# Patient Record
Sex: FEMALE | Race: OTHER | HISPANIC OR LATINO
[De-identification: names, ages, dates, MRNs, and addresses within clinical notes are randomized per-mention and may not be internally consistent; named-entity substitution may affect disease eponyms.]

---

## 2017-01-18 ENCOUNTER — RESULT REVIEW (OUTPATIENT)
Age: 37
End: 2017-01-18

## 2017-02-01 ENCOUNTER — APPOINTMENT (OUTPATIENT)
Dept: MAMMOGRAPHY | Facility: CLINIC | Age: 37
End: 2017-02-01

## 2017-02-01 ENCOUNTER — APPOINTMENT (OUTPATIENT)
Dept: ULTRASOUND IMAGING | Facility: CLINIC | Age: 37
End: 2017-02-01

## 2017-02-01 ENCOUNTER — OUTPATIENT (OUTPATIENT)
Dept: OUTPATIENT SERVICES | Facility: HOSPITAL | Age: 37
LOS: 1 days | End: 2017-02-01
Payer: COMMERCIAL

## 2017-02-01 DIAGNOSIS — Z00.8 ENCOUNTER FOR OTHER GENERAL EXAMINATION: ICD-10-CM

## 2017-02-01 PROCEDURE — 76641 ULTRASOUND BREAST COMPLETE: CPT

## 2017-02-01 PROCEDURE — 77066 DX MAMMO INCL CAD BI: CPT

## 2017-02-01 PROCEDURE — G0279: CPT

## 2018-08-09 ENCOUNTER — RESULT REVIEW (OUTPATIENT)
Age: 38
End: 2018-08-09

## 2018-08-13 ENCOUNTER — APPOINTMENT (OUTPATIENT)
Dept: ULTRASOUND IMAGING | Facility: CLINIC | Age: 38
End: 2018-08-13
Payer: SELF-PAY

## 2018-08-13 ENCOUNTER — OUTPATIENT (OUTPATIENT)
Dept: OUTPATIENT SERVICES | Facility: HOSPITAL | Age: 38
LOS: 1 days | End: 2018-08-13
Payer: MEDICAID

## 2018-08-13 DIAGNOSIS — Z00.8 ENCOUNTER FOR OTHER GENERAL EXAMINATION: ICD-10-CM

## 2018-08-13 PROCEDURE — 76856 US EXAM PELVIC COMPLETE: CPT

## 2018-08-13 PROCEDURE — 76830 TRANSVAGINAL US NON-OB: CPT | Mod: 26

## 2018-08-13 PROCEDURE — 76830 TRANSVAGINAL US NON-OB: CPT

## 2018-08-13 PROCEDURE — 76856 US EXAM PELVIC COMPLETE: CPT | Mod: 26

## 2018-09-10 ENCOUNTER — RESULT REVIEW (OUTPATIENT)
Age: 38
End: 2018-09-10

## 2019-10-02 ENCOUNTER — RESULT REVIEW (OUTPATIENT)
Age: 39
End: 2019-10-02

## 2019-11-10 ENCOUNTER — RESULT REVIEW (OUTPATIENT)
Age: 39
End: 2019-11-10

## 2020-03-23 ENCOUNTER — EMERGENCY (EMERGENCY)
Facility: HOSPITAL | Age: 40
LOS: 0 days | Discharge: HOME | End: 2020-03-23
Payer: MEDICAID

## 2020-03-23 VITALS
TEMPERATURE: 100 F | OXYGEN SATURATION: 100 % | DIASTOLIC BLOOD PRESSURE: 91 MMHG | RESPIRATION RATE: 16 BRPM | SYSTOLIC BLOOD PRESSURE: 144 MMHG | HEART RATE: 100 BPM

## 2020-03-23 DIAGNOSIS — Z20.828 CONTACT WITH AND (SUSPECTED) EXPOSURE TO OTHER VIRAL COMMUNICABLE DISEASES: ICD-10-CM

## 2020-03-23 DIAGNOSIS — R05 COUGH: ICD-10-CM

## 2020-03-23 DIAGNOSIS — R50.9 FEVER, UNSPECIFIED: ICD-10-CM

## 2020-03-23 DIAGNOSIS — R07.0 PAIN IN THROAT: ICD-10-CM

## 2020-03-23 DIAGNOSIS — B97.29 OTHER CORONAVIRUS AS THE CAUSE OF DISEASES CLASSIFIED ELSEWHERE: ICD-10-CM

## 2020-03-23 PROCEDURE — 99283 EMERGENCY DEPT VISIT LOW MDM: CPT

## 2020-03-23 PROCEDURE — U0001: CPT

## 2020-03-23 NOTE — ED STATDOCS - PATIENT PORTAL LINK FT
You can access the FollowMyHealth Patient Portal offered by [facility] by registering at the following website: [email or web address]. By joining [employer]’s FollowMyHealth portal, you will also be able to view your health information using other applications (apps) compatible with our system.

## 2020-03-23 NOTE — ED STATDOCS - CARE PLAN
Principal Discharge DX:	Fever, unspecified fever cause  Secondary Diagnosis:	Body aches  Secondary Diagnosis:	Sore throat

## 2020-03-23 NOTE — ED STATDOCS - NSFOLLOWUPINSTRUCTIONS_ED_ALL_ED_FT
How to get your Coronavirus (COVID-19) Testing Results:   Please be advised that you were tested for the coronavirus (COVID-19) in the Emergency Department at [facility].  You are to maintain self-quarantine procedures for 14 days until instructed otherwise by one of our healthcare agents. Please note that the test may take up to 2-4 days to result.  If you do not hear from us within 72 hours and you'd like to check on your results, you can call on of our coronavirus specialists at [address] [place] [address] (available 24/7).  Please DO NOT call the site where you received the test to obtain your results.     Return to the ED for worsening sx including SOB

## 2020-03-23 NOTE — ED STATDOCS - OBJECTIVE STATEMENT
Pt presents to ED with fever, body aches, sore throat x3  days. Pt recently exposed to COVID-19. Pt here for testing.

## 2020-10-05 ENCOUNTER — RESULT REVIEW (OUTPATIENT)
Age: 40
End: 2020-10-05

## 2021-03-05 ENCOUNTER — EMERGENCY (EMERGENCY)
Facility: HOSPITAL | Age: 41
LOS: 0 days | Discharge: HOME | End: 2021-03-05
Attending: EMERGENCY MEDICINE
Payer: MEDICAID

## 2021-03-05 VITALS
TEMPERATURE: 98 F | DIASTOLIC BLOOD PRESSURE: 71 MMHG | SYSTOLIC BLOOD PRESSURE: 119 MMHG | HEART RATE: 74 BPM | RESPIRATION RATE: 16 BRPM | OXYGEN SATURATION: 100 %

## 2021-03-05 VITALS — WEIGHT: 166.89 LBS | HEIGHT: 62 IN

## 2021-03-05 DIAGNOSIS — R10.11 RIGHT UPPER QUADRANT PAIN: ICD-10-CM

## 2021-03-05 DIAGNOSIS — Z20.822 CONTACT WITH AND (SUSPECTED) EXPOSURE TO COVID-19: ICD-10-CM

## 2021-03-05 PROBLEM — Z78.9 OTHER SPECIFIED HEALTH STATUS: Chronic | Status: ACTIVE | Noted: 2020-03-23

## 2021-03-05 LAB
ALBUMIN SERPL ELPH-MCNC: 3.8 G/DL — SIGNIFICANT CHANGE UP (ref 3.3–5)
ALP SERPL-CCNC: 95 U/L — SIGNIFICANT CHANGE UP (ref 40–120)
ALT FLD-CCNC: 49 U/L — SIGNIFICANT CHANGE UP (ref 12–78)
ANION GAP SERPL CALC-SCNC: 4 MMOL/L — LOW (ref 5–17)
APPEARANCE UR: CLEAR — SIGNIFICANT CHANGE UP
APTT BLD: 32.2 SEC — SIGNIFICANT CHANGE UP (ref 27.5–35.5)
AST SERPL-CCNC: 17 U/L — SIGNIFICANT CHANGE UP (ref 15–37)
BASOPHILS # BLD AUTO: 0.08 K/UL — SIGNIFICANT CHANGE UP (ref 0–0.2)
BASOPHILS NFR BLD AUTO: 0.7 % — SIGNIFICANT CHANGE UP (ref 0–2)
BILIRUB SERPL-MCNC: 0.1 MG/DL — LOW (ref 0.2–1.2)
BILIRUB UR-MCNC: NEGATIVE — SIGNIFICANT CHANGE UP
BUN SERPL-MCNC: 11 MG/DL — SIGNIFICANT CHANGE UP (ref 7–23)
CALCIUM SERPL-MCNC: 8.6 MG/DL — SIGNIFICANT CHANGE UP (ref 8.5–10.1)
CHLORIDE SERPL-SCNC: 107 MMOL/L — SIGNIFICANT CHANGE UP (ref 96–108)
CO2 SERPL-SCNC: 28 MMOL/L — SIGNIFICANT CHANGE UP (ref 22–31)
COLOR SPEC: YELLOW — SIGNIFICANT CHANGE UP
CREAT SERPL-MCNC: 0.72 MG/DL — SIGNIFICANT CHANGE UP (ref 0.5–1.3)
DIFF PNL FLD: ABNORMAL
EOSINOPHIL # BLD AUTO: 0.39 K/UL — SIGNIFICANT CHANGE UP (ref 0–0.5)
EOSINOPHIL NFR BLD AUTO: 3.4 % — SIGNIFICANT CHANGE UP (ref 0–6)
GLUCOSE SERPL-MCNC: 92 MG/DL — SIGNIFICANT CHANGE UP (ref 70–99)
GLUCOSE UR QL: NEGATIVE MG/DL — SIGNIFICANT CHANGE UP
HCT VFR BLD CALC: 36.4 % — SIGNIFICANT CHANGE UP (ref 34.5–45)
HGB BLD-MCNC: 11.7 G/DL — SIGNIFICANT CHANGE UP (ref 11.5–15.5)
IMM GRANULOCYTES NFR BLD AUTO: 0.5 % — SIGNIFICANT CHANGE UP (ref 0–1.5)
INR BLD: 0.99 RATIO — SIGNIFICANT CHANGE UP (ref 0.88–1.16)
KETONES UR-MCNC: NEGATIVE — SIGNIFICANT CHANGE UP
LEUKOCYTE ESTERASE UR-ACNC: ABNORMAL
LIDOCAIN IGE QN: 107 U/L — SIGNIFICANT CHANGE UP (ref 73–393)
LYMPHOCYTES # BLD AUTO: 2.84 K/UL — SIGNIFICANT CHANGE UP (ref 1–3.3)
LYMPHOCYTES # BLD AUTO: 25 % — SIGNIFICANT CHANGE UP (ref 13–44)
MCHC RBC-ENTMCNC: 26.4 PG — LOW (ref 27–34)
MCHC RBC-ENTMCNC: 32.1 GM/DL — SIGNIFICANT CHANGE UP (ref 32–36)
MCV RBC AUTO: 82.2 FL — SIGNIFICANT CHANGE UP (ref 80–100)
MONOCYTES # BLD AUTO: 0.99 K/UL — HIGH (ref 0–0.9)
MONOCYTES NFR BLD AUTO: 8.7 % — SIGNIFICANT CHANGE UP (ref 2–14)
NEUTROPHILS # BLD AUTO: 6.98 K/UL — SIGNIFICANT CHANGE UP (ref 1.8–7.4)
NEUTROPHILS NFR BLD AUTO: 61.7 % — SIGNIFICANT CHANGE UP (ref 43–77)
NITRITE UR-MCNC: NEGATIVE — SIGNIFICANT CHANGE UP
PH UR: 6 — SIGNIFICANT CHANGE UP (ref 5–8)
PLATELET # BLD AUTO: 259 K/UL — SIGNIFICANT CHANGE UP (ref 150–400)
POTASSIUM SERPL-MCNC: 3.5 MMOL/L — SIGNIFICANT CHANGE UP (ref 3.5–5.3)
POTASSIUM SERPL-SCNC: 3.5 MMOL/L — SIGNIFICANT CHANGE UP (ref 3.5–5.3)
PROT SERPL-MCNC: 7.6 GM/DL — SIGNIFICANT CHANGE UP (ref 6–8.3)
PROT UR-MCNC: NEGATIVE MG/DL — SIGNIFICANT CHANGE UP
PROTHROM AB SERPL-ACNC: 11.6 SEC — SIGNIFICANT CHANGE UP (ref 10.6–13.6)
RBC # BLD: 4.43 M/UL — SIGNIFICANT CHANGE UP (ref 3.8–5.2)
RBC # FLD: 14.9 % — HIGH (ref 10.3–14.5)
SARS-COV-2 RNA SPEC QL NAA+PROBE: SIGNIFICANT CHANGE UP
SODIUM SERPL-SCNC: 139 MMOL/L — SIGNIFICANT CHANGE UP (ref 135–145)
SP GR SPEC: 1.01 — SIGNIFICANT CHANGE UP (ref 1.01–1.02)
UROBILINOGEN FLD QL: NEGATIVE MG/DL — SIGNIFICANT CHANGE UP
WBC # BLD: 11.34 K/UL — HIGH (ref 3.8–10.5)
WBC # FLD AUTO: 11.34 K/UL — HIGH (ref 3.8–10.5)

## 2021-03-05 PROCEDURE — 80053 COMPREHEN METABOLIC PANEL: CPT

## 2021-03-05 PROCEDURE — 87040 BLOOD CULTURE FOR BACTERIA: CPT

## 2021-03-05 PROCEDURE — 96375 TX/PRO/DX INJ NEW DRUG ADDON: CPT | Mod: XU

## 2021-03-05 PROCEDURE — 86901 BLOOD TYPING SEROLOGIC RH(D): CPT

## 2021-03-05 PROCEDURE — 83690 ASSAY OF LIPASE: CPT

## 2021-03-05 PROCEDURE — 81025 URINE PREGNANCY TEST: CPT | Mod: XU

## 2021-03-05 PROCEDURE — 87086 URINE CULTURE/COLONY COUNT: CPT

## 2021-03-05 PROCEDURE — 78226 HEPATOBILIARY SYSTEM IMAGING: CPT | Mod: 26

## 2021-03-05 PROCEDURE — 36415 COLL VENOUS BLD VENIPUNCTURE: CPT

## 2021-03-05 PROCEDURE — A9537: CPT

## 2021-03-05 PROCEDURE — 85610 PROTHROMBIN TIME: CPT

## 2021-03-05 PROCEDURE — 76705 ECHO EXAM OF ABDOMEN: CPT | Mod: 26

## 2021-03-05 PROCEDURE — 99284 EMERGENCY DEPT VISIT MOD MDM: CPT | Mod: 25

## 2021-03-05 PROCEDURE — 87635 SARS-COV-2 COVID-19 AMP PRB: CPT

## 2021-03-05 PROCEDURE — 85025 COMPLETE CBC W/AUTO DIFF WBC: CPT

## 2021-03-05 PROCEDURE — 81001 URINALYSIS AUTO W/SCOPE: CPT

## 2021-03-05 PROCEDURE — 96374 THER/PROPH/DIAG INJ IV PUSH: CPT | Mod: XU

## 2021-03-05 PROCEDURE — 78226 HEPATOBILIARY SYSTEM IMAGING: CPT

## 2021-03-05 PROCEDURE — U0005: CPT

## 2021-03-05 PROCEDURE — 99284 EMERGENCY DEPT VISIT MOD MDM: CPT

## 2021-03-05 PROCEDURE — 76705 ECHO EXAM OF ABDOMEN: CPT

## 2021-03-05 PROCEDURE — 96376 TX/PRO/DX INJ SAME DRUG ADON: CPT | Mod: XU

## 2021-03-05 PROCEDURE — 85730 THROMBOPLASTIN TIME PARTIAL: CPT

## 2021-03-05 PROCEDURE — 86850 RBC ANTIBODY SCREEN: CPT

## 2021-03-05 PROCEDURE — 86900 BLOOD TYPING SEROLOGIC ABO: CPT

## 2021-03-05 RX ORDER — FAMOTIDINE 10 MG/ML
20 INJECTION INTRAVENOUS ONCE
Refills: 0 | Status: COMPLETED | OUTPATIENT
Start: 2021-03-05 | End: 2021-03-05

## 2021-03-05 RX ORDER — PIPERACILLIN AND TAZOBACTAM 4; .5 G/20ML; G/20ML
3.38 INJECTION, POWDER, LYOPHILIZED, FOR SOLUTION INTRAVENOUS ONCE
Refills: 0 | Status: COMPLETED | OUTPATIENT
Start: 2021-03-05 | End: 2021-03-05

## 2021-03-05 RX ORDER — ACETAMINOPHEN 500 MG
650 TABLET ORAL ONCE
Refills: 0 | Status: COMPLETED | OUTPATIENT
Start: 2021-03-05 | End: 2021-03-05

## 2021-03-05 RX ORDER — ONDANSETRON 8 MG/1
4 TABLET, FILM COATED ORAL ONCE
Refills: 0 | Status: COMPLETED | OUTPATIENT
Start: 2021-03-05 | End: 2021-03-05

## 2021-03-05 RX ORDER — MORPHINE SULFATE 50 MG/1
4 CAPSULE, EXTENDED RELEASE ORAL ONCE
Refills: 0 | Status: DISCONTINUED | OUTPATIENT
Start: 2021-03-05 | End: 2021-03-05

## 2021-03-05 RX ORDER — SODIUM CHLORIDE 9 MG/ML
1000 INJECTION INTRAMUSCULAR; INTRAVENOUS; SUBCUTANEOUS ONCE
Refills: 0 | Status: COMPLETED | OUTPATIENT
Start: 2021-03-05 | End: 2021-03-05

## 2021-03-05 RX ADMIN — MORPHINE SULFATE 4 MILLIGRAM(S): 50 CAPSULE, EXTENDED RELEASE ORAL at 03:03

## 2021-03-05 RX ADMIN — MORPHINE SULFATE 4 MILLIGRAM(S): 50 CAPSULE, EXTENDED RELEASE ORAL at 05:23

## 2021-03-05 RX ADMIN — ONDANSETRON 4 MILLIGRAM(S): 8 TABLET, FILM COATED ORAL at 03:03

## 2021-03-05 RX ADMIN — Medication 650 MILLIGRAM(S): at 10:32

## 2021-03-05 RX ADMIN — FAMOTIDINE 20 MILLIGRAM(S): 10 INJECTION INTRAVENOUS at 10:56

## 2021-03-05 RX ADMIN — SODIUM CHLORIDE 1000 MILLILITER(S): 9 INJECTION INTRAMUSCULAR; INTRAVENOUS; SUBCUTANEOUS at 04:52

## 2021-03-05 RX ADMIN — MORPHINE SULFATE 4 MILLIGRAM(S): 50 CAPSULE, EXTENDED RELEASE ORAL at 04:52

## 2021-03-05 RX ADMIN — SODIUM CHLORIDE 2000 MILLILITER(S): 9 INJECTION INTRAMUSCULAR; INTRAVENOUS; SUBCUTANEOUS at 03:03

## 2021-03-05 RX ADMIN — PIPERACILLIN AND TAZOBACTAM 200 GRAM(S): 4; .5 INJECTION, POWDER, LYOPHILIZED, FOR SOLUTION INTRAVENOUS at 05:22

## 2021-03-05 NOTE — ED PROVIDER NOTE - PATIENT PORTAL LINK FT
You can access the FollowMyHealth Patient Portal offered by [facility] by registering at the following website: [email or web address]. By joining [employer]’s FollowMyHealth portal, you will also be able to view your health information using other applications (apps) compatible with our system.

## 2021-03-05 NOTE — ED PROVIDER NOTE - PROGRESS NOTE DETAILS
Attending sage Cortez/ajit Bradshaw for surgery consult [phone] - , pt updated on results of tests and plan for surg consult and to start abx. pt feeling better, hida negative asking for tylenol for headache, will dc home to f/u with gastroenterology. abdomen reevaluated soft nontender no re bound or guarding

## 2021-03-05 NOTE — CONSULT NOTE ADULT - ASSESSMENT
40F with RUQ pain and findings suspicious for acute cholecystitis    Plan:  Pain control  Nausea control PRN  Antibiotics  NPO  IV Hydration  recc HIDA scan  Incentive Spirometry  COVID Swab  Encourage OOB to ambulation  GI and DVT ppx  Venodynes  Plan for possible Laparoscopic cholecystectomy if HIDA is positive    Case discussed with Dr. Bradshaw

## 2021-03-05 NOTE — ED PROVIDER NOTE - CONSTITUTIONAL, MLM
normal... Well appearing, awake, alert, oriented to person, place, time/situation and in moderate distress due to pain.

## 2021-03-05 NOTE — ED ADULT TRIAGE NOTE - CHIEF COMPLAINT QUOTE
patient c/o RUQ abdominal pain.  patient reports onset of pain around 3 pm and reports pain radiates to back.  denies N/V/D, chest pain, SOB.  no meds taken PTA.

## 2021-03-05 NOTE — CONSULT NOTE ADULT - SUBJECTIVE AND OBJECTIVE BOX
40F presents with one day of RUQ abdominal pain. Never had pain like this before. Not associated with nausea/vomiting. Last BM yesterday, normal as per patient. Denies fevers, chills, diarrhea, constipation, shortness of breath.    PMH: denies  PSH: denies  NKDA              Vital Signs Last 24 Hrs  T(C): 36.7 (05 Mar 2021 01:34), Max: 36.7 (05 Mar 2021 01:34)  T(F): 98.1 (05 Mar 2021 01:34), Max: 98.1 (05 Mar 2021 01:34)  HR: 77 (05 Mar 2021 03:01) (77 - 82)  BP: 137/82 (05 Mar 2021 03:01) (137/82 - 147/96)  BP(mean): 96 (05 Mar 2021 03:01) (96 - 108)  RR: 17 (05 Mar 2021 03:01) (15 - 17)  SpO2: 100% (05 Mar 2021 03:01) (100% - 100%)    Labs:                                11.7   11.34 )-----------( 259      ( 05 Mar 2021 02:21 )             36.4     CBC Full  -  ( 05 Mar 2021 02:21 )  WBC Count : 11.34 K/uL  RBC Count : 4.43 M/uL  Hemoglobin : 11.7 g/dL  Hematocrit : 36.4 %  Platelet Count - Automated : 259 K/uL  Mean Cell Volume : 82.2 fl  Mean Cell Hemoglobin : 26.4 pg  Mean Cell Hemoglobin Concentration : 32.1 gm/dL  Auto Neutrophil # : 6.98 K/uL  Auto Lymphocyte # : 2.84 K/uL  Auto Monocyte # : 0.99 K/uL  Auto Eosinophil # : 0.39 K/uL  Auto Basophil # : 0.08 K/uL  Auto Neutrophil % : 61.7 %  Auto Lymphocyte % : 25.0 %  Auto Monocyte % : 8.7 %  Auto Eosinophil % : 3.4 %  Auto Basophil % : 0.7 %    03-05    139  |  107  |  11  ----------------------------<  92  3.5   |  28  |  0.72    Ca    8.6      05 Mar 2021 02:21    TPro  7.6  /  Alb  3.8  /  TBili  0.1<L>  /  DBili  x   /  AST  17  /  ALT  49  /  AlkPhos  95  03-05    LIVER FUNCTIONS - ( 05 Mar 2021 02:21 )  Alb: 3.8 g/dL / Pro: 7.6 gm/dL / ALK PHOS: 95 U/L / ALT: 49 U/L / AST: 17 U/L / GGT: x           PT/INR - ( 05 Mar 2021 02:21 )   PT: 11.6 sec;   INR: 0.99 ratio         PTT - ( 05 Mar 2021 02:21 )  PTT:32.2 sec      Meds:  morphine  - Injectable 4 milliGRAM(s) IV Push once  piperacillin/tazobactam IVPB. 3.375 Gram(s) IV Intermittent Once      Radiology:    EXAM: US ABDOMEN RT UPR QUADRANT      PROCEDURE DATE: 03/05/2021        INTERPRETATION: CLINICAL INDICATION: Right upper quadrant pain, assess gallbladder disease.    TECHNIQUE: Targeted right upper quadrant ultrasound of the abdomen was performed.    COMPARISON: None.    FINDINGS: This study was technically difficult due to bowel gas.    LIVER: 18.2 cm in length. Increased echogenicity, suggestive of fatty infiltration.  BILIARY: No intrahepatic or extrahepatic biliary dilatation. Visualized common bile duct measuring up to 0.4 cm.  GALLBLADDER: Distended. No obvious gallstone or sludge. No significant gallbladder wall thickening or pericholecystic fluid. Positive sonographic Escobar sign. Patient was not premedicated.  PANCREAS: Poorly visualized due to bowel gas.  RIGHT KIDNEY: 12.9 cm in length. No hydronephrosis or obvious renal stone.  ADDITIONAL: No ascites.    IMPRESSION:    Distended gallbladder without obvious gallstone, sludge, gallbladder wall thickening or pedicle cystic fluid. Positive sonographic Escobar sign. If there is clinical suspicion for acute cholecystitis, a hepatobiliary scan may be obtained for further evaluation.    Physical Exam:  Pt is AAOx3  General: Well developed, in no acute distress.   Abdomen: Soft, RUQ tenderness to deep palpation, no masses, nondistended.   Skin: Normal, no rashes, no lesions noted.   Extremities: Warm, well perfused, no edema, Pulses intact

## 2021-03-05 NOTE — ED PROVIDER NOTE - OBJECTIVE STATEMENT
- [id number] used.  Pt with no sig PMH and past surgical hx "to not have babies" presents to ED for abd pain. Started ~12 hours ago.  pain right abd. No n/v/d, pain worse with movement.  denies trauma, no travel.  pain sever 8/10.  pt brought to ED by friend.

## 2021-03-05 NOTE — ED PROVIDER NOTE - NSFOLLOWUPCLINICS_GEN_ALL_ED_FT
[facility]  Family Medicine  [address] [place] [address]  Phone: [phone]  Fax:   Follow Up Time:

## 2021-03-05 NOTE — ED ADULT NURSE NOTE - OBJECTIVE STATEMENT
Pt ambulatory to Ed for c/o right upper abd pain for the last three days. Describes ans flank pain into back. Denies fever nausea or vomiting. No acute distress at this time.

## 2021-03-05 NOTE — ED PROVIDER NOTE - CARE PROVIDERS DIRECT ADDRESSES
[email or web address]

## 2021-03-05 NOTE — ED ADULT NURSE NOTE - NSIMPLEMENTINTERV_GEN_ALL_ED
Implemented All Fall Risk Interventions:  [place] to call system. Call bell, personal items and telephone within reach. Instruct patient to call for assistance. Room bathroom lighting operational. Non-slip footwear when patient is off stretcher. Physically safe environment: no spills, clutter or unnecessary equipment. Stretcher in lowest position, wheels locked, appropriate side rails in place. Provide visual cue, wrist band, yellow gown, etc. Monitor gait and stability. Monitor for mental status changes and reorient to person, place, and time. Review medications for side effects contributing to fall risk. Reinforce activity limits and safety measures with patient and family.

## 2021-03-06 LAB
CULTURE RESULTS: SIGNIFICANT CHANGE UP
SPECIMEN SOURCE: SIGNIFICANT CHANGE UP

## 2021-03-10 LAB
CULTURE RESULTS: SIGNIFICANT CHANGE UP
SPECIMEN SOURCE: SIGNIFICANT CHANGE UP

## 2021-06-11 ENCOUNTER — EMERGENCY (EMERGENCY)
Facility: HOSPITAL | Age: 41
LOS: 1 days | Discharge: HOME | End: 2021-06-11
Attending: EMERGENCY MEDICINE | Admitting: EMERGENCY MEDICINE
Payer: MEDICAID

## 2021-06-11 VITALS
TEMPERATURE: 98 F | RESPIRATION RATE: 16 BRPM | DIASTOLIC BLOOD PRESSURE: 76 MMHG | SYSTOLIC BLOOD PRESSURE: 132 MMHG | HEART RATE: 72 BPM | OXYGEN SATURATION: 100 %

## 2021-06-11 VITALS
DIASTOLIC BLOOD PRESSURE: 96 MMHG | HEIGHT: 62 IN | TEMPERATURE: 98 F | HEART RATE: 83 BPM | OXYGEN SATURATION: 100 % | RESPIRATION RATE: 18 BRPM | WEIGHT: 160.06 LBS | SYSTOLIC BLOOD PRESSURE: 158 MMHG

## 2021-06-11 LAB
ALBUMIN SERPL ELPH-MCNC: 3.8 G/DL — SIGNIFICANT CHANGE UP (ref 3.3–5)
ALP SERPL-CCNC: 78 U/L — SIGNIFICANT CHANGE UP (ref 30–120)
ALT FLD-CCNC: 30 U/L DA — SIGNIFICANT CHANGE UP (ref 10–60)
ANION GAP SERPL CALC-SCNC: 7 MMOL/L — SIGNIFICANT CHANGE UP (ref 5–17)
APPEARANCE UR: CLEAR — SIGNIFICANT CHANGE UP
AST SERPL-CCNC: 12 U/L — SIGNIFICANT CHANGE UP (ref 10–40)
BACTERIA # UR AUTO: ABNORMAL
BASOPHILS # BLD AUTO: 0.07 K/UL — SIGNIFICANT CHANGE UP (ref 0–0.2)
BASOPHILS NFR BLD AUTO: 0.5 % — SIGNIFICANT CHANGE UP (ref 0–2)
BILIRUB SERPL-MCNC: 0.2 MG/DL — SIGNIFICANT CHANGE UP (ref 0.2–1.2)
BILIRUB UR-MCNC: NEGATIVE — SIGNIFICANT CHANGE UP
BUN SERPL-MCNC: 12 MG/DL — SIGNIFICANT CHANGE UP (ref 7–23)
CALCIUM SERPL-MCNC: 8.6 MG/DL — SIGNIFICANT CHANGE UP (ref 8.4–10.5)
CHLORIDE SERPL-SCNC: 100 MMOL/L — SIGNIFICANT CHANGE UP (ref 96–108)
CO2 SERPL-SCNC: 27 MMOL/L — SIGNIFICANT CHANGE UP (ref 22–31)
COLOR SPEC: YELLOW — SIGNIFICANT CHANGE UP
COMMENT - URINE: SIGNIFICANT CHANGE UP
CREAT SERPL-MCNC: 0.86 MG/DL — SIGNIFICANT CHANGE UP (ref 0.5–1.3)
DIFF PNL FLD: ABNORMAL
EOSINOPHIL # BLD AUTO: 0.41 K/UL — SIGNIFICANT CHANGE UP (ref 0–0.5)
EOSINOPHIL NFR BLD AUTO: 3.1 % — SIGNIFICANT CHANGE UP (ref 0–6)
EPI CELLS # UR: SIGNIFICANT CHANGE UP
GLUCOSE SERPL-MCNC: 102 MG/DL — HIGH (ref 70–99)
GLUCOSE UR QL: NEGATIVE MG/DL — SIGNIFICANT CHANGE UP
HCG UR QL: NEGATIVE — SIGNIFICANT CHANGE UP
HCT VFR BLD CALC: 36.2 % — SIGNIFICANT CHANGE UP (ref 34.5–45)
HGB BLD-MCNC: 11.7 G/DL — SIGNIFICANT CHANGE UP (ref 11.5–15.5)
IMM GRANULOCYTES NFR BLD AUTO: 0.5 % — SIGNIFICANT CHANGE UP (ref 0–1.5)
KETONES UR-MCNC: NEGATIVE — SIGNIFICANT CHANGE UP
LEUKOCYTE ESTERASE UR-ACNC: ABNORMAL
LIDOCAIN IGE QN: 106 U/L — SIGNIFICANT CHANGE UP (ref 73–393)
LYMPHOCYTES # BLD AUTO: 34.6 % — SIGNIFICANT CHANGE UP (ref 13–44)
LYMPHOCYTES # BLD AUTO: 4.55 K/UL — HIGH (ref 1–3.3)
MCHC RBC-ENTMCNC: 26.7 PG — LOW (ref 27–34)
MCHC RBC-ENTMCNC: 32.3 GM/DL — SIGNIFICANT CHANGE UP (ref 32–36)
MCV RBC AUTO: 82.6 FL — SIGNIFICANT CHANGE UP (ref 80–100)
MONOCYTES # BLD AUTO: 1 K/UL — HIGH (ref 0–0.9)
MONOCYTES NFR BLD AUTO: 7.6 % — SIGNIFICANT CHANGE UP (ref 2–14)
NEUTROPHILS # BLD AUTO: 7.05 K/UL — SIGNIFICANT CHANGE UP (ref 1.8–7.4)
NEUTROPHILS NFR BLD AUTO: 53.7 % — SIGNIFICANT CHANGE UP (ref 43–77)
NITRITE UR-MCNC: NEGATIVE — SIGNIFICANT CHANGE UP
NRBC # BLD: 0 /100 WBCS — SIGNIFICANT CHANGE UP (ref 0–0)
PH UR: 7 — SIGNIFICANT CHANGE UP (ref 5–8)
PLATELET # BLD AUTO: 241 K/UL — SIGNIFICANT CHANGE UP (ref 150–400)
POTASSIUM SERPL-MCNC: 3 MMOL/L — LOW (ref 3.5–5.3)
POTASSIUM SERPL-SCNC: 3 MMOL/L — LOW (ref 3.5–5.3)
PROT SERPL-MCNC: 7.2 G/DL — SIGNIFICANT CHANGE UP (ref 6–8.3)
PROT UR-MCNC: NEGATIVE MG/DL — SIGNIFICANT CHANGE UP
RBC # BLD: 4.38 M/UL — SIGNIFICANT CHANGE UP (ref 3.8–5.2)
RBC # FLD: 15.4 % — HIGH (ref 10.3–14.5)
RBC CASTS # UR COMP ASSIST: SIGNIFICANT CHANGE UP /HPF (ref 0–4)
SODIUM SERPL-SCNC: 134 MMOL/L — LOW (ref 135–145)
SP GR SPEC: 1.01 — SIGNIFICANT CHANGE UP (ref 1.01–1.02)
UROBILINOGEN FLD QL: NEGATIVE MG/DL — SIGNIFICANT CHANGE UP
WBC # BLD: 13.15 K/UL — HIGH (ref 3.8–10.5)
WBC # FLD AUTO: 13.15 K/UL — HIGH (ref 3.8–10.5)
WBC UR QL: SIGNIFICANT CHANGE UP

## 2021-06-11 PROCEDURE — 85025 COMPLETE CBC W/AUTO DIFF WBC: CPT

## 2021-06-11 PROCEDURE — 76705 ECHO EXAM OF ABDOMEN: CPT | Mod: 26

## 2021-06-11 PROCEDURE — 80053 COMPREHEN METABOLIC PANEL: CPT

## 2021-06-11 PROCEDURE — 81025 URINE PREGNANCY TEST: CPT

## 2021-06-11 PROCEDURE — 99284 EMERGENCY DEPT VISIT MOD MDM: CPT | Mod: 25

## 2021-06-11 PROCEDURE — A9537: CPT

## 2021-06-11 PROCEDURE — 76705 ECHO EXAM OF ABDOMEN: CPT

## 2021-06-11 PROCEDURE — 96361 HYDRATE IV INFUSION ADD-ON: CPT

## 2021-06-11 PROCEDURE — 78226 HEPATOBILIARY SYSTEM IMAGING: CPT | Mod: 26,MA

## 2021-06-11 PROCEDURE — 78226 HEPATOBILIARY SYSTEM IMAGING: CPT

## 2021-06-11 PROCEDURE — 36415 COLL VENOUS BLD VENIPUNCTURE: CPT

## 2021-06-11 PROCEDURE — 83690 ASSAY OF LIPASE: CPT

## 2021-06-11 PROCEDURE — 96375 TX/PRO/DX INJ NEW DRUG ADDON: CPT

## 2021-06-11 PROCEDURE — 81001 URINALYSIS AUTO W/SCOPE: CPT

## 2021-06-11 PROCEDURE — 99284 EMERGENCY DEPT VISIT MOD MDM: CPT

## 2021-06-11 PROCEDURE — 96374 THER/PROPH/DIAG INJ IV PUSH: CPT

## 2021-06-11 PROCEDURE — 96376 TX/PRO/DX INJ SAME DRUG ADON: CPT

## 2021-06-11 RX ORDER — POTASSIUM CHLORIDE 20 MEQ
40 PACKET (EA) ORAL ONCE
Refills: 0 | Status: COMPLETED | OUTPATIENT
Start: 2021-06-11 | End: 2021-06-11

## 2021-06-11 RX ORDER — SODIUM CHLORIDE 9 MG/ML
1000 INJECTION INTRAMUSCULAR; INTRAVENOUS; SUBCUTANEOUS ONCE
Refills: 0 | Status: COMPLETED | OUTPATIENT
Start: 2021-06-11 | End: 2021-06-11

## 2021-06-11 RX ORDER — KETOROLAC TROMETHAMINE 30 MG/ML
30 SYRINGE (ML) INJECTION ONCE
Refills: 0 | Status: DISCONTINUED | OUTPATIENT
Start: 2021-06-11 | End: 2021-06-11

## 2021-06-11 RX ORDER — PIPERACILLIN AND TAZOBACTAM 4; .5 G/20ML; G/20ML
3.38 INJECTION, POWDER, LYOPHILIZED, FOR SOLUTION INTRAVENOUS ONCE
Refills: 0 | Status: COMPLETED | OUTPATIENT
Start: 2021-06-11 | End: 2021-06-11

## 2021-06-11 RX ORDER — MORPHINE SULFATE 50 MG/1
4 CAPSULE, EXTENDED RELEASE ORAL ONCE
Refills: 0 | Status: DISCONTINUED | OUTPATIENT
Start: 2021-06-11 | End: 2021-06-11

## 2021-06-11 RX ORDER — SODIUM CHLORIDE 9 MG/ML
1000 INJECTION INTRAMUSCULAR; INTRAVENOUS; SUBCUTANEOUS
Refills: 0 | Status: DISCONTINUED | OUTPATIENT
Start: 2021-06-11 | End: 2021-06-14

## 2021-06-11 RX ORDER — MORPHINE SULFATE 50 MG/1
3 CAPSULE, EXTENDED RELEASE ORAL ONCE
Refills: 0 | Status: DISCONTINUED | OUTPATIENT
Start: 2021-06-11 | End: 2021-06-11

## 2021-06-11 RX ADMIN — Medication 30 MILLIGRAM(S): at 03:59

## 2021-06-11 RX ADMIN — SODIUM CHLORIDE 1000 MILLILITER(S): 9 INJECTION INTRAMUSCULAR; INTRAVENOUS; SUBCUTANEOUS at 04:34

## 2021-06-11 RX ADMIN — MORPHINE SULFATE 3 MILLIGRAM(S): 50 CAPSULE, EXTENDED RELEASE ORAL at 11:51

## 2021-06-11 RX ADMIN — PIPERACILLIN AND TAZOBACTAM 200 GRAM(S): 4; .5 INJECTION, POWDER, LYOPHILIZED, FOR SOLUTION INTRAVENOUS at 06:18

## 2021-06-11 RX ADMIN — MORPHINE SULFATE 4 MILLIGRAM(S): 50 CAPSULE, EXTENDED RELEASE ORAL at 05:56

## 2021-06-11 RX ADMIN — Medication 40 MILLIEQUIVALENT(S): at 04:09

## 2021-06-11 RX ADMIN — Medication 30 MILLIGRAM(S): at 04:59

## 2021-06-11 RX ADMIN — SODIUM CHLORIDE 150 MILLILITER(S): 9 INJECTION INTRAMUSCULAR; INTRAVENOUS; SUBCUTANEOUS at 05:41

## 2021-06-11 RX ADMIN — SODIUM CHLORIDE 1000 MILLILITER(S): 9 INJECTION INTRAMUSCULAR; INTRAVENOUS; SUBCUTANEOUS at 03:59

## 2021-06-11 RX ADMIN — MORPHINE SULFATE 4 MILLIGRAM(S): 50 CAPSULE, EXTENDED RELEASE ORAL at 05:41

## 2021-06-11 NOTE — ED PROVIDER NOTE - CARE PROVIDER_API CALL
Oziel Luna)  ColonRectal Surgery; Surgery  [address] [place] [address]  Phone: [phone]  Fax: [phone]  Follow Up Time: 1-3 Days    Alexandru Paredes ()  Internal Medicine  [address] [place] [address]  Phone: [phone]  Fax: [phone]  Follow Up Time: 1-3 Days

## 2021-06-11 NOTE — ED ADULT NURSE REASSESSMENT NOTE - NS ED NURSE REASSESS COMMENT FT1
0715- Pt received resting comfortably on stretcher- sleeping at intervals. Pain subsided. IV patent & fusing well- site without signs of infiltrate. Awaiting HIDA scan

## 2021-06-11 NOTE — ED PROVIDER NOTE - OBJECTIVE STATEMENT
Patient c/o ruq pain radiating to her back for about 9 hours. +n/v. No fever. No diarrhea. No urinary complaint. Had the pain a month ago and was seen at [facility]. Diagnosed with gallstones.

## 2021-06-11 NOTE — ED ADULT TRIAGE NOTE - CHIEF COMPLAINT QUOTE
Im having URQ abdominal pain since last night 6pm; Im feeling nausea; denies v/d. States she was seen in [facility] about 3 months ago and was told she needs to get gallbladder removed but is unsure what happened.

## 2021-06-11 NOTE — ED ADULT NURSE NOTE - CHIEF COMPLAINT QUOTE
Im having URQ abdominal pain since last night 6pm; Im feeling nausea; denies v/d. States she was seen in [facility] about 3 months ago and was told she needs to get gallbladder removed but is unsure what happened.

## 2021-06-11 NOTE — ED PROVIDER NOTE - PROVIDER TOKENS
PROVIDER:[TOKEN:[[id number]],FOLLOWUP:[1-3 Days]],PROVIDER:[TOKEN:[[id number]],FOLLOWUP:[1-3 Days]]

## 2021-06-11 NOTE — ED PROVIDER NOTE - PROGRESS NOTE DETAILS
stacy (surg) cleared for d/c and outpt f/u with surg and gi. xlator # [id number] Reevaluated patient at bedside.  Patient feeling much improved.  Discussed the results of all diagnostic testing in ED and copies of all reports given.   An opportunity to ask questions was given.  Discussed the importance of prompt, close medical follow-up.  Patient will return with any changes, concerns or persistent / worsening symptoms.  Understanding of all instructions verbalized.

## 2021-06-11 NOTE — ED PROVIDER NOTE - PATIENT PORTAL LINK FT
You can access the FollowMyHealth Patient Portal offered by [facility] by registering at the following website: [email or web address]. By joining [employer]’s FollowMyHealth portal, you will also be able to view your health information using other applications (apps) compatible with our system.

## 2021-10-24 ENCOUNTER — EMERGENCY (EMERGENCY)
Facility: HOSPITAL | Age: 41
LOS: 0 days | Discharge: HOME | End: 2021-10-24
Attending: EMERGENCY MEDICINE
Payer: MEDICAID

## 2021-10-24 VITALS
DIASTOLIC BLOOD PRESSURE: 88 MMHG | TEMPERATURE: 98 F | HEART RATE: 71 BPM | OXYGEN SATURATION: 99 % | RESPIRATION RATE: 18 BRPM | SYSTOLIC BLOOD PRESSURE: 158 MMHG

## 2021-10-24 VITALS — WEIGHT: 149.91 LBS | HEIGHT: 62 IN

## 2021-10-24 DIAGNOSIS — M79.602 PAIN IN LEFT ARM: ICD-10-CM

## 2021-10-24 DIAGNOSIS — M25.512 PAIN IN LEFT SHOULDER: ICD-10-CM

## 2021-10-24 DIAGNOSIS — M79.10 MYALGIA, UNSPECIFIED SITE: ICD-10-CM

## 2021-10-24 DIAGNOSIS — M54.9 DORSALGIA, UNSPECIFIED: ICD-10-CM

## 2021-10-24 DIAGNOSIS — M25.511 PAIN IN RIGHT SHOULDER: ICD-10-CM

## 2021-10-24 DIAGNOSIS — M79.601 PAIN IN RIGHT ARM: ICD-10-CM

## 2021-10-24 LAB
APPEARANCE UR: CLEAR — SIGNIFICANT CHANGE UP
BILIRUB UR-MCNC: NEGATIVE — SIGNIFICANT CHANGE UP
COLOR SPEC: YELLOW — SIGNIFICANT CHANGE UP
DIFF PNL FLD: ABNORMAL
GLUCOSE UR QL: NEGATIVE MG/DL — SIGNIFICANT CHANGE UP
KETONES UR-MCNC: NEGATIVE — SIGNIFICANT CHANGE UP
LEUKOCYTE ESTERASE UR-ACNC: NEGATIVE — SIGNIFICANT CHANGE UP
NITRITE UR-MCNC: NEGATIVE — SIGNIFICANT CHANGE UP
PH UR: 6 — SIGNIFICANT CHANGE UP (ref 5–8)
PROT UR-MCNC: NEGATIVE MG/DL — SIGNIFICANT CHANGE UP
SARS-COV-2 RNA SPEC QL NAA+PROBE: SIGNIFICANT CHANGE UP
SP GR SPEC: 1.02 — SIGNIFICANT CHANGE UP (ref 1.01–1.02)
UROBILINOGEN FLD QL: NEGATIVE MG/DL — SIGNIFICANT CHANGE UP

## 2021-10-24 PROCEDURE — 99284 EMERGENCY DEPT VISIT MOD MDM: CPT

## 2021-10-24 PROCEDURE — 93005 ELECTROCARDIOGRAM TRACING: CPT

## 2021-10-24 PROCEDURE — 71046 X-RAY EXAM CHEST 2 VIEWS: CPT | Mod: 26

## 2021-10-24 PROCEDURE — 81001 URINALYSIS AUTO W/SCOPE: CPT

## 2021-10-24 PROCEDURE — 71046 X-RAY EXAM CHEST 2 VIEWS: CPT

## 2021-10-24 PROCEDURE — 93010 ELECTROCARDIOGRAM REPORT: CPT

## 2021-10-24 PROCEDURE — 87086 URINE CULTURE/COLONY COUNT: CPT

## 2021-10-24 PROCEDURE — 81025 URINE PREGNANCY TEST: CPT

## 2021-10-24 PROCEDURE — 99283 EMERGENCY DEPT VISIT LOW MDM: CPT | Mod: 25

## 2021-10-24 PROCEDURE — 87635 SARS-COV-2 COVID-19 AMP PRB: CPT

## 2021-10-24 RX ORDER — IBUPROFEN 200 MG
1 TABLET ORAL
Qty: 15 | Refills: 0
Start: 2021-10-24

## 2021-10-24 RX ORDER — IBUPROFEN 200 MG
600 TABLET ORAL ONCE
Refills: 0 | Status: COMPLETED | OUTPATIENT
Start: 2021-10-24 | End: 2021-10-24

## 2021-10-24 RX ADMIN — Medication 600 MILLIGRAM(S): at 02:51

## 2021-10-24 NOTE — ED PROVIDER NOTE - PROGRESS NOTE DETAILS
Patient lying down.  States ibuprofen made her feel a little better.  Viral illness likely.  Covid swab pending.  CXR and UA normal.

## 2021-10-24 NOTE — ED ADULT NURSE NOTE - OBJECTIVE STATEMENT
Pt ambulatory to ED for bilateral shoulder pain. Pt states pain started a couple hours prior to arrival to ED. Denies falls, trauma or infections. No obvious signs of deformities. No acute distress noted at this time.

## 2021-10-24 NOTE — ED ADULT NURSE NOTE - NSIMPLEMENTINTERV_GEN_ALL_ED
Implemented All Fall Risk Interventions:  [place] to call system. Call bell, personal items and telephone within reach. Instruct patient to call for assistance. Room bathroom lighting operational. Non-slip footwear when patient is off stretcher. Physically safe environment: no spills, clutter or unnecessary equipment. Stretcher in lowest position, wheels locked, appropriate side rails in place. Provide visual cue, wrist band, yellow gown, etc. Monitor gait and stability. Monitor for mental status changes and reorient to person, place, and time. Review medications for side effects contributing to fall risk. Reinforce activity limits and safety measures with patient and family.

## 2021-10-24 NOTE — ED PROVIDER NOTE - OBJECTIVE STATEMENT
Pt. is a 40 yo F without any medical problems presenting with bilateral shoulder and arm pain, back pain, and body aches since about 7pm last night.  Body aches were of gradual onset.  Patient took tylenol with minimal relief.  Patient denies fever, vomiting, coughing, sore throat, dysuria, vomiting or diarrhea.  She denies any heavy lifting, excessive exercise, or injury.

## 2021-10-24 NOTE — ED ADULT TRIAGE NOTE - CHIEF COMPLAINT QUOTE
Pt. presents to ED c/o bilateral shoulder pain. Pt. states pain began at appx. 7pm tonight. Pt denies fall/injury

## 2021-10-24 NOTE — ED PROVIDER NOTE - NSFOLLOWUPCLINICS_GEN_ALL_ED_FT
[facility]  Family Medicine  [address] [place] [address]  Phone: [phone]  Fax:

## 2021-10-24 NOTE — ED PROVIDER NOTE - PATIENT PORTAL LINK FT
You can access the FollowMyHealth Patient Portal offered by [facility] by registering at the following website: [email or web address]. By joining [employer]’s FollowMyHealth portal, you will also be able to view your health information using other applications (apps) compatible with our system.

## 2021-10-24 NOTE — ED PROVIDER NOTE - NSFOLLOWUPINSTRUCTIONS_ED_ALL_ED_FT
[facility]                                                                                  Dolor muscular en los adultos    Muscle Pain, Adult      El dolor muscular, también llamado mialgia, es lul afección que causa dolor en kody o más músculos del cuerpo. El dolor muscular puede ser leve, moderado o intenso. Puede ser salvatore, lul molestia continua o lul sensación de ardor. La mayoría de las veces, el dolor dura solo un corto período y desaparece sin tratamiento.    El dolor muscular puede ser el resultado de usar los músculos de forma nueva o diferente, o después de un período de inactividad. Es normal sentir algo de dolor muscular después de comenzar un programa de ejercicios. Generalmente duelen aquellos músculos que no se utilizan con frecuencia.      ¿Cuáles son las causas?  La causa de esta afección es el uso de los músculos de un modo nuevo o diferente después de un período de inactividad. Algunas otras causas son las siguientes:  •Uso excesivo del músculo o distensión muscular, en especial si la persona no está en buen estado físico. Esta es la causa más común del dolor muscular.      •Lesiones o moretones.      •Enfermedades infecciosas, incluso las enfermedades causadas por virus, patricia la gripe (influenza).      •Fibromialgia.Es lul afección crónica, de larga duración, que causa sensibilidad muscular, cansancio (fatiga) y dolor de jagdish.      •Enfermedades autoinmunes o reumatológicas. Son afecciones, patricia el lupus, que se caracterizan porque el sistema de defensa del cuerpo (sistemainmunitario) ataca zonas del cuerpo.      •Determinados medicamentos, patricia los inhibidores de la enzima convertidora de angiotensina (IECA) y las estatinas.        ¿Cuáles son los signos o síntomas?    El síntoma principal de esta afección es la inflamación o el dolor muscular, incluso al hacer actividad física y al elongar. También puede siria lul leve hinchazón.      ¿Cómo se diagnostica?    Esta afección se diagnostica mediante un examen físico. El médico le hará preguntas acerca del dolor y cuándo comenzó a sentirlo. Si el dolor muscular no comenzó hace mucho tiempo, el médico puede esperar antes de hacer diversas pruebas. Si el dolor muscular ha durado mucho tiempo, se pueden realizar pruebas de inmediato. En algunos casos, se pueden realizar pruebas para descartar ciertas afecciones o enfermedades.      ¿Cómo se trata?    El tratamiento de esta afección depende de la causa. El cuidado en el hogar a menudo es suficiente para aliviar el dolor muscular. El médico también puede recetarle antiinflamatorios no esteroideos (BEVERLEY), patricia ibuprofeno.      Siga estas instrucciones en harrell casa:    Medicamentos     •[place] los medicamentos de venta stevo y los recetados solamente patricia se lo haya indicado el médico.      •Pregúntele al médico si el medicamento recetado le impide conducir o usar maquinaria.        Manejo del dolor, la hinchazón y las molestias                 •Si se lo indican, aplique hielo sobre la mello dolorida. Para hacer esto:  •Ponga el hielo en lul bolsa plástica.      •Coloque lul toalla entre la piel y la bolsa.      •Aplique el hielo scarlett 20 minutos, 2 a 3 veces por día.      •Scarlett los primeros 2 días de dolor muscular, o si hay hinchazón:  •No se dé deepak calientes.      •No use el jacuzzi, baño noé, sauna, almohadillas térmicas ni ninguna otra malick de calor.      •Después de que transcurran entre 48 y 72 horas, puede alternar entre aplicar hielo y aplicar calor patricia se lo haya indicado el médico. Si se lo indican, aplique calor en la mello afectada con la frecuencia que le haya indicado el médico. Use la malick de calor que el médico le recomiende, patricia lul compresa de calor húmedo o lul almohadilla térmica.  •Coloque lul toalla entre la piel y la malick de calor.      •Aplique calor scarlett 20 a 30 minutos.      •Retire la malick de calor si la piel se pone de color marquez brillante. [place] es especialmente importante si no puede sentir dolor, calor o frío. Puede correr un riesgo mayor de sufrir quemaduras.        •Si tiene lul lesión, levante (eleve) la mello lesionada por encima del nivel del corazón mientras esté sentado o acostado.        Actividad    •Si el uso excesivo del músculo está provocando dolor muscular:  •Disminuya leticia actividades hasta que el dolor desaparezca.      •Si usted no hace actividad física con frecuencia, los ejercicios deben ser suaves y regulares.      •Precaliente antes de la actividad física. Elongue antes y después de hacer ejercicios. [place] puede ayudar a disminuir el riesgo de que sufra dolor muscular.        • No siga haciendo actividad física si el dolor es muy intenso. El dolor intenso podría indicar que se ha lesionado un músculo.      • No levante ningún objeto que pese más de 5 a 10 libras (2.3 a 4.5 kg) o que supere el límite de peso que le hayan indicado, hasta que el médico le diga que puede hacerlo.      •Retome leticia actividades normales según lo indicado por el médico. Pregúntele al médico qué actividades son seguras para usted.      Instrucciones generales     • No consuma ningún producto que contenga nicotina o tabaco, patricia cigarrillos, cigarrillos electrónicos y tabaco de mascar. Estos pueden retrasar la recuperación. Si necesita ayuda para dejar de fumar, consulte al médico.      •Concurra a todas las visitas de seguimiento patricia se lo haya indicado el médico. [place] es importante.        Comuníquese con un médico si tiene:    •Dolor muscular que empeora y los medicamentos no son eficaces.      •Dolor muscular que dura más de 3 días.      •Lul erupción cutánea o fiebre junto con el dolor muscular.      •Dolor muscular después de lul picadura de garrapata.      •Dolor muscular mientras hace actividad física, aunque esté en buen estado físico.      •Enrojecimiento, sensibilidad o hinchazón junto con el dolor muscular.      •Dolor muscular después de comenzar un medicamento nuevo o de cambiar la dosis de un medicamento.        Solicite ayuda inmediatamente si tiene:    •Dificultad para respirar.      •Dificultad para tragar.      •Dolor muscular junto con rigidez en el ger, fiebre y vómitos.      •Debilidad muscular intensa o imposibilidad de  lul parte del cuerpo.      Estos síntomas pueden representar un problema grave que constituye lul emergencia. No espere a siobhan si los síntomas desaparecen. Solicite atención médica de inmediato. Comuníquese con el servicio de emergencias de harrell localidad (911 en los [place]). No conduzca por leticia propios medios hasta el hospital.       Resumen    •Generalmente, el dolor muscular dura solo un corto período y desaparece sin tratamiento.      •La causa de esta afección es el uso de los músculos de un modo nuevo o diferente después de un período de inactividad.      •Si el dolor muscular dura más de 3 días, infórmeselo al médico.      Esta información no tiene patricia fin reemplazar el consejo del médico. Asegúrese de hacerle al médico cualquier pregunta que tenga.      Document Revised: 12/14/2020 Document Reviewed: 12/14/2020    Elsevier Patient Education © 2021 [employer] Inc[employer]

## 2021-10-25 LAB
CULTURE RESULTS: SIGNIFICANT CHANGE UP
SPECIMEN SOURCE: SIGNIFICANT CHANGE UP

## 2022-03-11 ENCOUNTER — APPOINTMENT (OUTPATIENT)
Dept: MAMMOGRAPHY | Facility: CLINIC | Age: 42
End: 2022-03-11
Payer: MEDICAID

## 2022-03-11 ENCOUNTER — OUTPATIENT (OUTPATIENT)
Dept: OUTPATIENT SERVICES | Facility: HOSPITAL | Age: 42
LOS: 1 days | End: 2022-03-11
Payer: MEDICAID

## 2022-03-11 DIAGNOSIS — Z00.8 ENCOUNTER FOR OTHER GENERAL EXAMINATION: ICD-10-CM

## 2022-03-11 PROCEDURE — 77067 SCR MAMMO BI INCL CAD: CPT

## 2022-03-11 PROCEDURE — 77067 SCR MAMMO BI INCL CAD: CPT | Mod: 26

## 2022-03-11 PROCEDURE — 77063 BREAST TOMOSYNTHESIS BI: CPT

## 2022-03-11 PROCEDURE — 77063 BREAST TOMOSYNTHESIS BI: CPT | Mod: 26

## 2022-05-04 ENCOUNTER — RESULT REVIEW (OUTPATIENT)
Age: 42
End: 2022-05-04

## 2022-07-10 ENCOUNTER — EMERGENCY (EMERGENCY)
Facility: HOSPITAL | Age: 42
LOS: 0 days | Discharge: HOME | End: 2022-07-10
Attending: EMERGENCY MEDICINE
Payer: MEDICAID

## 2022-07-10 VITALS
OXYGEN SATURATION: 99 % | RESPIRATION RATE: 17 BRPM | DIASTOLIC BLOOD PRESSURE: 70 MMHG | TEMPERATURE: 99 F | SYSTOLIC BLOOD PRESSURE: 135 MMHG | HEART RATE: 75 BPM

## 2022-07-10 VITALS — WEIGHT: 160.06 LBS | HEIGHT: 62 IN

## 2022-07-10 DIAGNOSIS — T78.40XA ALLERGY, UNSPECIFIED, INITIAL ENCOUNTER: ICD-10-CM

## 2022-07-10 DIAGNOSIS — L73.9 FOLLICULAR DISORDER, UNSPECIFIED: ICD-10-CM

## 2022-07-10 DIAGNOSIS — R21 RASH AND OTHER NONSPECIFIC SKIN ERUPTION: ICD-10-CM

## 2022-07-10 PROCEDURE — 99283 EMERGENCY DEPT VISIT LOW MDM: CPT

## 2022-07-10 RX ORDER — DIPHENHYDRAMINE HCL 50 MG
25 CAPSULE ORAL ONCE
Refills: 0 | Status: COMPLETED | OUTPATIENT
Start: 2022-07-10 | End: 2022-07-10

## 2022-07-10 RX ORDER — DIPHENHYDRAMINE HCL 50 MG
1 CAPSULE ORAL
Qty: 28 | Refills: 0
Start: 2022-07-10 | End: 2022-07-16

## 2022-07-10 RX ADMIN — Medication 100 MILLIGRAM(S): at 23:25

## 2022-07-10 RX ADMIN — Medication 25 MILLIGRAM(S): at 23:25

## 2022-07-10 NOTE — ED PROVIDER NOTE - OBJECTIVE STATEMENT
41 yo F no significant PMHx presnets with CC rash.  Rash since June 23.  States she was sitting outside at a graduation earlier in the day, later in the day developed rash on bilateral shoulders.  States it started and remains red, raised, and has moved to include her upper chest and now face, and is itchy.  Denies fever, chills, malaise, myalgias, lymphadenopthy, or any other symptoms. She states she's been putting Aquaphor on it without improvement.  No other concerns.

## 2022-07-10 NOTE — ED ADULT NURSE NOTE - PRO INTERPRETER NEED 2
Will send message back to  to work with patient on transportation issuesas he was very anxious regarding transportation    FYI to Dr Naima Goldsmith Vietnamese

## 2022-07-10 NOTE — ED ADULT TRIAGE NOTE - CHIEF COMPLAINT QUOTE
Pt presents o the ED c/o rash since 6/23. Pt states "I think the rash is from the sun." Pt c/o itching. Pt using topical creams without relief.

## 2022-07-10 NOTE — ED PROVIDER NOTE - PATIENT PORTAL LINK FT
You can access the FollowMyHealth Patient Portal offered by [facility] by registering at the following website: [email or web address]. By joining [employer]’s FollowMyHealth portal, you will also be able to view your health information using other applications (apps) compatible with our system.

## 2022-07-10 NOTE — ED PROVIDER NOTE - CLINICAL SUMMARY MEDICAL DECISION MAKING FREE TEXT BOX
Appears to have bacterial folliliculitis.  No concern for monkey pox.  No concern for SJS, TEN, or any other emergency rash.  D/c home with antibiotics, f/u with dermatology.

## 2022-07-10 NOTE — ED PROVIDER NOTE - NSFOLLOWUPCLINICS_GEN_ALL_ED_FT
[facility] - [place]  Dermatology  [address], [address]  [place] [address]  Phone: [phone]  Fax: [phone]

## 2022-07-10 NOTE — ED PROVIDER NOTE - NSFOLLOWUPINSTRUCTIONS_ED_ALL_ED_FT
Erupción cutánea en los adultos    Rash, Adult       Lul erupción es un cambio en el color de la piel. Lul erupción también puede cambiar la forma en que se siente la piel. Hay muchas afecciones y factores diferentes que pueden causar lul erupción. Algunas erupciones pueden desaparecer después de algunos días, olivia otras pueden durar algunas semanas. Entre las causas frecuentes de erupciones se incluyen las siguientes:•Infecciones virales patricia:  •Resfríos.      •Sarampión.      •Enfermedad mano-pie-boca.      •Infecciones bacterianas, patricia:  •Escarlatina.      •Impétigo.        •Infecciones por hongos, patricia Candida.      •Reacciones alérgicas a los alimentos, medicamentos o productos para el cuidado de la piel.        Siga estas indicaciones en harrell casa:    El objetivo del tratamiento es calmar la picazón y evitar que la erupción se propague. Esté atento a cualquier cambio en los síntomas. Estas indicaciones pueden ayudarlo con la afección:      Medicamentos      [place] o aplíquese los medicamentos de venta stevo y los recetados solamente patricia se lo haya indicado el médico. Estos medicamentos pueden incluir:  •Cremas con corticoesteroides para tratar la piel enrojecida o hinchada.      •Lociones para aliviar la picazón.      •Antialérgicos por vía oral (antihistamínicos).      •Corticoesteroides por vía oral para los síntomas graves.      Cuidado de la piel     •Aplique compresas frías en las zonas afectadas.      • No se rasque ni se refriegue la piel.      •Evite cubrir la erupción. Asegúrese de que la erupción esté expuesta al aire todo lo posible.      Control de la picazón y las molestias     •Evite los deepak de inmersión y las duchas calientes ya que pueden empeorar la picazón. Lul ducha fría puede aliviar el malestar.    •Trate de lilly un baño con lo siguiente:  •Sales de Eps[place]. Siga las indicaciones del fabricante que se encuentran en el envase. Puede conseguirlas en la aaron de comestibles o la farmacia local.      •Bicarbonato de sodio. Vierta un poco en la bañera patrciia se lo haya indicado el médico.      •Josh coloidal. Siga las indicaciones del fabricante que se encuentran en el envase. Puede conseguirla en la aaron de comestibles o la farmacia local.        •Intente colocarse lul pasta de bicarbonato de sodio sobre la piel. Agregue agua al bicarbonato hasta que tenga la consistencia de lul pasta.      •Pruebe aplicarse loción de calamina. Se trata de lul loción de venta stevo que ayuda a aliviar la picazón.      •Manténgase fresco y al resguardo talha. La transpiración y el calor pueden empeorar la picazón.        Indicaciones generales      •Descanse todo lo que sea necesario.      •Marga suficiente líquido patricia para mantener la orina de color amarillo pálido.      •Use ropa holgada.      •Evite los detergentes y los jabones perfumados, y los perfumes. Utilice jabones, detergentes, perfumes y cosméticos suaves.    •Evite las sustancias que causan la erupción. Lleve un diario patricia ayuda para registrar lo que le causa erupción. Escriba los siguientes datos:  •Lo que come.      •Los cosméticos que utiliza.      •Lo que ricardo.      •La ropa que usa. [place] incluye las alhajas.        •Concurra a todas las visitas de seguimiento patricia se lo haya indicado el médico. [place] es importante.        Comuníquese con un médico si:    •Transpira de noche.      •Pierde peso.      •Orina más de lo normal.      •Orina menos de lo normal u observa que la orina es de color más oscuro que lo habitual.      •Se siente débil.      •Vomita.      •Tiene un color amarillo en la piel o en las partes horace del moo (ictericia).    •La piel:  •Hormiguea.      •Se adormece.      •La erupción cutánea:  •No desaparece después de varios días.      •Empeora.      •Usted:  •Está inusualmente sediento.      •Está más cansado que lo habitual.      •Tiene los siguientes síntomas:  •Síntomas nuevos.      •Dolor en el abdomen.      •Fiebre.      •Diarrea.          Solicite ayuda inmediatamente si:    •Tiene fiebre y los síntomas empeoran repentinamente.      •Presenta confusión.      •Tiene un dolor de jagdish intenso o rigidez en el ger.      •Tiene dolor intenso o rigidez en las articulaciones.      •Tiene lul convulsión.      •Presenta lul erupción que cubre todo o krissy todo el cuerpo. La erupción puede o no ser dolorosa.    •Le aparecen ampollas que:  •Se encuentran arriba de la erupción.      •Se agrandan o crecen juntas.      •Son dolorosas.      •Están dentro de la nariz o la boca.      •Presenta lul erupción que:  •Tiene pequeñas manchas moradas, patricia si fueran pinchazos, en todo el cuerpo.      •Tiene un “moo de buey” o se parece a un marsh de tiro.      •No está relacionada con lul exposición al sol, está enrojecida y duele, y produce descamación de la piel.          Resumen    •Lul erupción es un cambio en el color de la piel. Algunas erupciones desaparecen después de algunos días, olivia otras pueden durar algunas semanas.      •El objetivo del tratamiento es calmar la picazón y evitar que la erupción se propague.      •[place] o aplíquese los medicamentos de venta stevo y los recetados solamente patricia se lo haya indicado el médico.      •Comuníquese con un médico si tiene síntomas nuevos o los síntomas empeoran.      •Concurra a todas las visitas de seguimiento patricia se lo haya indicado el médico. [place] es importante.      Esta información no tiene patricia fin reemplazar el consejo del médico. Asegúrese de hacerle al médico cualquier pregunta que tenga.      Document Revised: 08/22/2019 Document Reviewed: 08/22/2019    Elsevier Patient Education © 2022 Elsevier Inc.

## 2022-07-10 NOTE — ED PROVIDER NOTE - SKIN, MLM
papular, erythematous rash to bilateral shoulder, chest, small amount on bilateral face, no cellulitis, no vesicles, no bullae, no skin sloughing, no intraoral lesions, no lesions on calm or soles of feet

## 2022-07-25 ENCOUNTER — APPOINTMENT (OUTPATIENT)
Dept: DERMATOLOGY | Facility: CLINIC | Age: 42
End: 2022-07-25

## 2022-07-25 PROCEDURE — 99203 OFFICE O/P NEW LOW 30 MIN: CPT

## 2022-08-24 ENCOUNTER — APPOINTMENT (OUTPATIENT)
Dept: DERMATOLOGY | Facility: CLINIC | Age: 42
End: 2022-08-24

## 2022-08-24 PROCEDURE — 99213 OFFICE O/P EST LOW 20 MIN: CPT

## 2022-11-19 ENCOUNTER — OUTPATIENT (OUTPATIENT)
Dept: OUTPATIENT SERVICES | Facility: HOSPITAL | Age: 42
LOS: 1 days | End: 2022-11-19
Payer: MEDICAID

## 2022-11-19 ENCOUNTER — APPOINTMENT (OUTPATIENT)
Dept: MRI IMAGING | Facility: CLINIC | Age: 42
End: 2022-11-19

## 2022-11-19 DIAGNOSIS — Z00.8 ENCOUNTER FOR OTHER GENERAL EXAMINATION: ICD-10-CM

## 2022-11-19 DIAGNOSIS — R51.9 HEADACHE, UNSPECIFIED: ICD-10-CM

## 2022-11-19 PROCEDURE — 70551 MRI BRAIN STEM W/O DYE: CPT

## 2022-11-19 PROCEDURE — 70551 MRI BRAIN STEM W/O DYE: CPT | Mod: 26

## 2023-02-27 ENCOUNTER — APPOINTMENT (OUTPATIENT)
Dept: DERMATOLOGY | Facility: CLINIC | Age: 43
End: 2023-02-27

## 2023-08-16 NOTE — ED ADULT NURSE NOTE - PRO INTERPRETER NEED 2
Post-Op Assessment Note    CV Status:  Stable  Pain Score: 0    Pain management: adequate     Mental Status:  Sleepy and arousable   Hydration Status:  Stable and euvolemic   PONV Controlled:  None   Airway Patency:  Patent      Post Op Vitals Reviewed: Yes      Staff: CRNA         No notable events documented.     BP  178/88   Temp      Pulse 91   Resp 18   SpO2 99 [place]

## 2023-11-10 NOTE — ED STATDOCS - TOBACCO USE
Health Maintenance Due   Topic Date Due   • COVID-19 Vaccine (1) Never done   • Influenza Vaccine (1) 09/01/2023   • IPV Vaccine (4 of 4 - 4-dose series) 11/05/2023   • MMR Vaccine (2 of 2 - Standard series) 11/05/2023   • Varicella Vaccine (2 of 2 - 2-dose childhood series) 11/05/2023   • DTaP/Tdap/Td Vaccine (5 - DTaP) 11/05/2023   • Annual Physical (ages 3-18)  11/08/2023       Patient is due for topics listed above, she wishes to proceed with Immunization(s) Dtap/Tdap/Td, Influenza, IPV, MMR and Varicella, but is not proceeding with Immunization(s) COVID-19 at this time. The following has occurred: Patient is following pediatric unified immunization schedule for immunizations.     Never smoker

## 2023-12-13 ENCOUNTER — EMERGENCY (EMERGENCY)
Facility: HOSPITAL | Age: 43
LOS: 0 days | Discharge: HOME | End: 2023-12-13
Attending: EMERGENCY MEDICINE
Payer: MEDICAID

## 2023-12-13 VITALS — HEIGHT: 62 IN | WEIGHT: 169.98 LBS

## 2023-12-13 VITALS
DIASTOLIC BLOOD PRESSURE: 87 MMHG | TEMPERATURE: 98 F | HEART RATE: 96 BPM | OXYGEN SATURATION: 100 % | SYSTOLIC BLOOD PRESSURE: 138 MMHG

## 2023-12-13 DIAGNOSIS — I10 ESSENTIAL (PRIMARY) HYPERTENSION: ICD-10-CM

## 2023-12-13 DIAGNOSIS — N39.0 URINARY TRACT INFECTION, SITE NOT SPECIFIED: ICD-10-CM

## 2023-12-13 DIAGNOSIS — R31.9 HEMATURIA, UNSPECIFIED: ICD-10-CM

## 2023-12-13 DIAGNOSIS — R50.9 FEVER, UNSPECIFIED: ICD-10-CM

## 2023-12-13 DIAGNOSIS — R30.0 DYSURIA: ICD-10-CM

## 2023-12-13 LAB
APPEARANCE UR: ABNORMAL
APPEARANCE UR: ABNORMAL
BILIRUB UR-MCNC: NEGATIVE — SIGNIFICANT CHANGE UP
BILIRUB UR-MCNC: NEGATIVE — SIGNIFICANT CHANGE UP
COLOR SPEC: SIGNIFICANT CHANGE UP
COLOR SPEC: SIGNIFICANT CHANGE UP
DIFF PNL FLD: ABNORMAL
DIFF PNL FLD: ABNORMAL
GLUCOSE UR QL: NEGATIVE MG/DL — SIGNIFICANT CHANGE UP
GLUCOSE UR QL: NEGATIVE MG/DL — SIGNIFICANT CHANGE UP
KETONES UR-MCNC: NEGATIVE MG/DL — SIGNIFICANT CHANGE UP
KETONES UR-MCNC: NEGATIVE MG/DL — SIGNIFICANT CHANGE UP
LEUKOCYTE ESTERASE UR-ACNC: ABNORMAL
LEUKOCYTE ESTERASE UR-ACNC: ABNORMAL
NITRITE UR-MCNC: POSITIVE
NITRITE UR-MCNC: POSITIVE
PH UR: 5 — SIGNIFICANT CHANGE UP (ref 5–8)
PH UR: 5 — SIGNIFICANT CHANGE UP (ref 5–8)
PROT UR-MCNC: 30 MG/DL
PROT UR-MCNC: 30 MG/DL
SP GR SPEC: 1.01 — SIGNIFICANT CHANGE UP (ref 1–1.03)
SP GR SPEC: 1.01 — SIGNIFICANT CHANGE UP (ref 1–1.03)
UROBILINOGEN FLD QL: 0.2 MG/DL — SIGNIFICANT CHANGE UP (ref 0.2–1)
UROBILINOGEN FLD QL: 0.2 MG/DL — SIGNIFICANT CHANGE UP (ref 0.2–1)

## 2023-12-13 PROCEDURE — 87186 SC STD MICRODIL/AGAR DIL: CPT

## 2023-12-13 PROCEDURE — 81001 URINALYSIS AUTO W/SCOPE: CPT

## 2023-12-13 PROCEDURE — 99283 EMERGENCY DEPT VISIT LOW MDM: CPT

## 2023-12-13 PROCEDURE — 99284 EMERGENCY DEPT VISIT MOD MDM: CPT

## 2023-12-13 PROCEDURE — 87086 URINE CULTURE/COLONY COUNT: CPT

## 2023-12-13 RX ORDER — CEPHALEXIN 500 MG
500 CAPSULE ORAL ONCE
Refills: 0 | Status: COMPLETED | OUTPATIENT
Start: 2023-12-13 | End: 2023-12-13

## 2023-12-13 RX ORDER — CEPHALEXIN 500 MG
1 CAPSULE ORAL
Qty: 28 | Refills: 0
Start: 2023-12-13 | End: 2023-12-19

## 2023-12-13 RX ADMIN — Medication 500 MILLIGRAM(S): at 23:16

## 2023-12-13 NOTE — ED STATDOCS - CLINICAL SUMMARY MEDICAL DECISION MAKING FREE TEXT BOX
In summary this is a 43-year-old female who presents to the emergency department with a chief complaint of dysuria and hematuria.  Vital signs are within normal limits on arrival, with exception of mild hypertension 146/103.  Patient with mild suprapubic tenderness on exam.  She has no CVA tenderness and no complaint of flank pain.  Unlikely pyelonephritis, or kidney stone.  Will obtain UA, urine culture, urine pregnancy test.  Will treat with Keflex for UTI.  Will send Keflex to pharmacy.  Recommend follow-up closely with PCP.  Strict return precautions given.  Patient verbalizes understanding and agrees to plan.

## 2023-12-13 NOTE — ED ADULT NURSE NOTE - OBJECTIVE STATEMENT
Pt AOx4 from home c/o urinary s/s of burning, pain and frequency. No acute distress noted. Pt denies flank pain, n/v/d, fatigue, dizziness or blurred vision.

## 2023-12-13 NOTE — ED STATDOCS - PATIENT PORTAL LINK FT
You can access the FollowMyHealth Patient Portal offered by [facility] by registering at the following website: [email or web address]. By joining [employer]’s FollowMyHealth portal, you will also be able to view your health information using other applications (apps) compatible with our system. You can access the FollowMyHealth Patient Portal offered by [facility] by registering at the following website: [email or web address]. By joining [employer]’s FollowMyHealth portal, you will also be able to view your health information using other applications (apps) compatible with our system.

## 2023-12-13 NOTE — ED ADULT NURSE NOTE - NSFALLUNIVINTERV_ED_ALL_ED
Bed/Stretcher in lowest position, wheels locked, appropriate side rails in place/Call bell, personal items and telephone in reach/Instruct patient to call for assistance before getting out of bed/chair/stretcher/Non-slip footwear applied when patient is off stretcher/[place] to call system/Physically safe environment - no spills, clutter or unnecessary equipment/Purposeful proactive rounding/Room/bathroom lighting operational, light cord in reach Bed/Stretcher in lowest position, wheels locked, appropriate side rails in place/Call bell, personal items and telephone in reach/Instruct patient to call for assistance before getting out of bed/chair/stretcher/Non-slip footwear applied when patient is off stretcher/[place] to call system/Physically safe environment - no spills, clutter or unnecessary equipment/Purposeful proactive rounding/Room/bathroom lighting operational, light cord in reach

## 2023-12-13 NOTE — ED STATDOCS - NSFOLLOWUPINSTRUCTIONS_ED_ALL_ED_FT
Infección urinaria en los adultos  Urinary Tract Infection, Adult    Nona infección urinaria (IU) puede ocurrir en cualquier lugar de las vías urinarias. Las vías urinarias incluyen a los riñones, los uréteres, la vejiga y la uretra. Estos órganos fabrican, almacenan y eliminan la orina del organismo.    La IU page afecta los uréteres y los riñones. La IU baja afecta la vejiga y la uretra.    ¿Cuáles son las causas?  La mayoría de las infecciones de las vías urinarias es causada por la presencia de bacterias en la mello genital, alrededor de la uretra, por donde sale la orina del cuerpo. Estas bacterias proliferan y causan inflamación en las vías urinarias.    ¿Qué incrementa el riesgo?  Es más probable que sufra esta afección si:  Tiene colocado un catéter urinario permanente.  No puede controlar cuándo orinar o defecar (incontinencia).  Es siria y usted:  Utiliza espermicida o diafragma patricia método anticonceptivo.  Tiene niveles bajos de estrógenos.  Está embarazada.  Tiene ciertos genes que aumentan harrell riesgo.  Es sexualmente activa.  Nicolasa antibióticos.  Tiene nona afección que causa que el flujo de orina sea lento, patricia:  Próstata agrandada, si usted es hombre.  Obstrucción de la uretra.  Cálculo renal.  Nona afección nerviosa que afecta el control de la vejiga (vejiga neurógena).  No ricardo lo suficiente o no orina con frecuencia.  Tiene ciertas enfermedades crónicas, patricia:  Diabetes.  Un sistema que combate las enfermedades (sistemainmunitario) debilitado.  Anemia drepanocítica.  Gota.  Lesión en la médula hall.  ¿Cuáles son los signos o síntomas?  Los síntomas de esta afección incluyen:  Necesidad inmediata (urgencia) de orinar.  Micción frecuente. [place] puede incluir pequeñas cantidades de orina cada vez que orina.  Ardor o dolor al orinar.  Presencia de dhruv en la orina.  Orina con mal olor u olor atípico.  Dificultad para orinar.  Orina turbia.  Secreción vaginal, si es siria.  Dolor en el abdomen o en la parte inferior de la espalda.  Es posible que también tenga:  Vómitos o disminución del apetito.  Confusión.  Irritabilidad o cansancio.  Fiebre o escalofríos.  Diarrea.  El primer síntoma en los adultos mayores puede ser la confusión. En algunos casos, es posible que no tengan síntomas hasta que la infección empeore.    ¿Cómo se diagnostica?  Esta afección se diagnostica en función de leticia antecedentes médicos y de un examen físico. También pueden hacerle otras pruebas, incluidas las siguientes:  Análisis de orina.  Análisis de dhruv.  Pruebas de infecciones de transmisión sexual (ITS).  Si ha tenido más de nona infección urinaria (IU), se pueden hacer estudios de diagnóstico por imágenes o nona cistoscopia para determinar la causa de las infecciones.    ¿Cómo se trata?  El tratamiento de esta afección incluye lo siguiente:  Antibióticos.  Medicamentos de venta stevo para aliviar las molestias.  Beber nona cantidad suficiente agua para mantenerse hidratado.  Si tiene infecciones con frecuencia o tiene otras afecciones, patricia un cálculo renal, es posible que deba siobhan a un médico especialista en las vías urinarias (urólogo).    En casos poco frecuentes, las infecciones urinarias pueden provocar sepsis. La sepsis es nona afección potencialmente mortal que se produce cuando el cuerpo responde a nona infección. La sepsis se trata en el hospital con antibióticos, líquidos y otros medicamentos que se administran por vía intravenosa.    Siga estas instrucciones en harrell casa:    Medicamentos    Use los medicamentos de venta stevo y los recetados solamente patricia se lo haya indicado el médico.  Si le recetaron un antibiótico, tómelo patricia se lo haya indicado el médico. No deje de usar el antibiótico aunque comience a sentirse mejor.  Instrucciones generales    Asegúrese de hacer lo siguiente:  Vaciar la vejiga con frecuencia y en harrell totalidad. No contener la orina scarlett largos períodos.  Vaciar la vejiga después de tener sexo.  Limpiarse de atrás hacia adelante después de orinar o defecar, si es siria. Usar cada trozo de papel higiénico solo nona vez cuando se limpie.  Beber suficiente líquido patricia para mantener la orina de color amarillo pálido.  Cumpla con todas las visitas de seguimiento. [place] es importante.  Comuníquese con un médico si:  Los síntomas no mejoran después de 1 o 2 días de tratamiento.  Los síntomas desaparecen y luego vuelven a aparecer.  Solicite ayuda de inmediato si:  Siente dolor intenso en la espalda o en la parte inferior del abdomen.  Tiene fiebre o escalofríos.  Tiene náuseas o vómitos.  Resumen  Nona infección urinaria (IU) es nona infección en cualquier parte de las vías urinarias, que incluyen los riñones, los uréteres, la vejiga y la uretra.  La mayoría de las infecciones de las vías urinarias es causada por bacterias en la mello genital.  El tratamiento de esta afección suele incluir antibióticos.  Si le recetaron un antibiótico, tómelo patricia se lo haya indicado el médico. No deje de usar el antibiótico aunque comience a sentirse mejor.  Cumpla con todas las visitas de seguimiento. [place] es importante.  Esta información no tiene patricia fin reemplazar el consejo del médico. Asegúrese de hacerle al médico cualquier pregunta que tenga.    Document Revised: 10/11/2021 Document Reviewed: 10/11/2021  [employer] Patient Education © 2023 [employer] Inc.  [employer] logo  Terms and Conditions  Privacy Policy  Editorial Policy  All content on this site: Copyright © 2023 Elsevier, its licensors, and contributors. All rights are reserved, including those for text and data mining, AI training, and similar technologies. For all open access content, the Creative Commons licensing terms apply.  Cookies are used by this site. To decline or learn more, visit our Cookies page. Infección urinaria en los adultos  Urinary Tract Infection, Adult    Nona infección urinaria (IU) puede ocurrir en cualquier lugar de las vías urinarias. Las vías urinarias incluyen a los riñones, los uréteres, la vejiga y la uretra. Estos órganos fabrican, almacenan y eliminan la orina del organismo.    La IU page afecta los uréteres y los riñones. La IU baja afecta la vejiga y la uretra.    ¿Cuáles son las causas?  La mayoría de las infecciones de las vías urinarias es causada por la presencia de bacterias en la mello genital, alrededor de la uretra, por donde sale la orina del cuerpo. Estas bacterias proliferan y causan inflamación en las vías urinarias.    ¿Qué incrementa el riesgo?  Es más probable que sufra esta afección si:  Tiene colocado un catéter urinario permanente.  No puede controlar cuándo orinar o defecar (incontinencia).  Es siria y usted:  Utiliza espermicida o diafragma patricia método anticonceptivo.  Tiene niveles bajos de estrógenos.  Está embarazada.  Tiene ciertos genes que aumentan harrell riesgo.  Es sexualmente activa.  Nicolasa antibióticos.  Tiene nona afección que causa que el flujo de orina sea lento, patricia:  Próstata agrandada, si usted es hombre.  Obstrucción de la uretra.  Cálculo renal.  Nona afección nerviosa que afecta el control de la vejiga (vejiga neurógena).  No ricardo lo suficiente o no orina con frecuencia.  Tiene ciertas enfermedades crónicas, patricia:  Diabetes.  Un sistema que combate las enfermedades (sistemainmunitario) debilitado.  Anemia drepanocítica.  Gota.  Lesión en la médula hall.  ¿Cuáles son los signos o síntomas?  Los síntomas de esta afección incluyen:  Necesidad inmediata (urgencia) de orinar.  Micción frecuente. [place] puede incluir pequeñas cantidades de orina cada vez que orina.  Ardor o dolor al orinar.  Presencia de dhruv en la orina.  Orina con mal olor u olor atípico.  Dificultad para orinar.  Orina turbia.  Secreción vaginal, si es siria.  Dolor en el abdomen o en la parte inferior de la espalda.  Es posible que también tenga:  Vómitos o disminución del apetito.  Confusión.  Irritabilidad o cansancio.  Fiebre o escalofríos.  Diarrea.  El primer síntoma en los adultos mayores puede ser la confusión. En algunos casos, es posible que no tengan síntomas hasta que la infección empeore.    ¿Cómo se diagnostica?  Esta afección se diagnostica en función de leticia antecedentes médicos y de un examen físico. También pueden hacerle otras pruebas, incluidas las siguientes:  Análisis de orina.  Análisis de dhruv.  Pruebas de infecciones de transmisión sexual (ITS).  Si ha tenido más de nona infección urinaria (IU), se pueden hacer estudios de diagnóstico por imágenes o nona cistoscopia para determinar la causa de las infecciones.    ¿Cómo se trata?  El tratamiento de esta afección incluye lo siguiente:  Antibióticos.  Medicamentos de venta stevo para aliviar las molestias.  Beber nona cantidad suficiente agua para mantenerse hidratado.  Si tiene infecciones con frecuencia o tiene otras afecciones, patricia un cálculo renal, es posible que deba siobhan a un médico especialista en las vías urinarias (urólogo).    En casos poco frecuentes, las infecciones urinarias pueden provocar sepsis. La sepsis es nona afección potencialmente mortal que se produce cuando el cuerpo responde a nona infección. La sepsis se trata en el hospital con antibióticos, líquidos y otros medicamentos que se administran por vía intravenosa.    Siga estas instrucciones en harrell casa:    Medicamentos    Use los medicamentos de venta stevo y los recetados solamente patricia se lo haya indicado el médico.  Si le recetaron un antibiótico, tómelo patricia se lo haya indicado el médico. No deje de usar el antibiótico aunque comience a sentirse mejor.  Instrucciones generales    Asegúrese de hacer lo siguiente:  Vaciar la vejiga con frecuencia y en harrell totalidad. No contener la orina scarlett largos períodos.  Vaciar la vejiga después de tener sexo.  Limpiarse de atrás hacia adelante después de orinar o defecar, si es siria. Usar cada trozo de papel higiénico solo nona vez cuando se limpie.  Beber suficiente líquido patricia para mantener la orina de color amarillo pálido.  Cumpla con todas las visitas de seguimiento. [place] es importante.  Comuníquese con un médico si:  Los síntomas no mejoran después de 1 o 2 días de tratamiento.  Los síntomas desaparecen y luego vuelven a aparecer.  Solicite ayuda de inmediato si:  Siente dolor intenso en la espalda o en la parte inferior del abdomen.  Tiene fiebre o escalofríos.  Tiene náuseas o vómitos.  Resumen  Nona infección urinaria (IU) es nona infección en cualquier parte de las vías urinarias, que incluyen los riñones, los uréteres, la vejiga y la uretra.  La mayoría de las infecciones de las vías urinarias es causada por bacterias en la mello genital.  El tratamiento de esta afección suele incluir antibióticos.  Si le recetaron un antibiótico, tómelo patricia se lo haya indicado el médico. No deje de usar el antibiótico aunque comience a sentirse mejor.  Cumpla con todas las visitas de seguimiento. [place] es importante.  Esta información no tiene patricia fin reemplazar el consejo del médico. Asegúrese de hacerle al médico cualquier pregunta que tenga.    Document Revised: 10/11/2021 Document Reviewed: 10/11/2021  [employer] Patient Education © 2023 [employer]  [employer] logo  Terms and Conditions  Privacy Policy  Editorial Policy  All content on this site: Copyright © 2023 Elsevier, its licensors, and contributors. All rights are reserved, including those for text and data mining, AI training, and similar technologies. For all open access content, the Creative Commons licensing terms apply.  Cookies are used by this site. To decline or learn more, visit our Cookies page.

## 2023-12-13 NOTE — ED STATDOCS - OBJECTIVE STATEMENT
43-year-old female with no significant past medical history presents to the emergency department with a complaint of dysuria.  Patient states that symptoms have been going on for 2 days.  Complains of dysuria, suprapubic discomfort, mild hematuria.  She endorses occasional subjective fever.  She denies flank pain, nausea, vomiting, vaginal bleeding or discharge, or any other symptoms.  She took over-the-counter to her [facility] for her discomfort.  No other concerns at this time.

## 2023-12-13 NOTE — ED STATDOCS - PROGRESS NOTE DETAILS
UA with nitrate + uti will start abx and dc home with PMD f/u, return precautions given  Reba Ac PA-C

## 2023-12-14 LAB
BACTERIA # UR AUTO: ABNORMAL /HPF
BACTERIA # UR AUTO: ABNORMAL /HPF
CAST: 0 /LPF — SIGNIFICANT CHANGE UP (ref 0–4)
CAST: 0 /LPF — SIGNIFICANT CHANGE UP (ref 0–4)
RBC CASTS # UR COMP ASSIST: 960 /HPF — HIGH (ref 0–4)
RBC CASTS # UR COMP ASSIST: 960 /HPF — HIGH (ref 0–4)
SQUAMOUS # UR AUTO: 9 /HPF — HIGH (ref 0–5)
SQUAMOUS # UR AUTO: 9 /HPF — HIGH (ref 0–5)
WBC UR QL: >998 /HPF — HIGH (ref 0–5)
WBC UR QL: >998 /HPF — HIGH (ref 0–5)

## 2023-12-17 LAB
-  AMOXICILLIN/CLAVULANIC ACID: SIGNIFICANT CHANGE UP
-  AMOXICILLIN/CLAVULANIC ACID: SIGNIFICANT CHANGE UP
-  AMPICILLIN/SULBACTAM: SIGNIFICANT CHANGE UP
-  AMPICILLIN/SULBACTAM: SIGNIFICANT CHANGE UP
-  AMPICILLIN: SIGNIFICANT CHANGE UP
-  AMPICILLIN: SIGNIFICANT CHANGE UP
-  AZTREONAM: SIGNIFICANT CHANGE UP
-  AZTREONAM: SIGNIFICANT CHANGE UP
-  CEFAZOLIN: SIGNIFICANT CHANGE UP
-  CEFAZOLIN: SIGNIFICANT CHANGE UP
-  CEFEPIME: SIGNIFICANT CHANGE UP
-  CEFEPIME: SIGNIFICANT CHANGE UP
-  CEFOXITIN: SIGNIFICANT CHANGE UP
-  CEFOXITIN: SIGNIFICANT CHANGE UP
-  CEFTRIAXONE: SIGNIFICANT CHANGE UP
-  CEFTRIAXONE: SIGNIFICANT CHANGE UP
-  CEFUROXIME: SIGNIFICANT CHANGE UP
-  CEFUROXIME: SIGNIFICANT CHANGE UP
-  CIPROFLOXACIN: SIGNIFICANT CHANGE UP
-  CIPROFLOXACIN: SIGNIFICANT CHANGE UP
-  ERTAPENEM: SIGNIFICANT CHANGE UP
-  ERTAPENEM: SIGNIFICANT CHANGE UP
-  GENTAMICIN: SIGNIFICANT CHANGE UP
-  GENTAMICIN: SIGNIFICANT CHANGE UP
-  IMIPENEM: SIGNIFICANT CHANGE UP
-  IMIPENEM: SIGNIFICANT CHANGE UP
-  LEVOFLOXACIN: SIGNIFICANT CHANGE UP
-  LEVOFLOXACIN: SIGNIFICANT CHANGE UP
-  MEROPENEM: SIGNIFICANT CHANGE UP
-  MEROPENEM: SIGNIFICANT CHANGE UP
-  NITROFURANTOIN: SIGNIFICANT CHANGE UP
-  NITROFURANTOIN: SIGNIFICANT CHANGE UP
-  PIPERACILLIN/TAZOBACTAM: SIGNIFICANT CHANGE UP
-  PIPERACILLIN/TAZOBACTAM: SIGNIFICANT CHANGE UP
-  TOBRAMYCIN: SIGNIFICANT CHANGE UP
-  TOBRAMYCIN: SIGNIFICANT CHANGE UP
-  TRIMETHOPRIM/SULFAMETHOXAZOLE: SIGNIFICANT CHANGE UP
-  TRIMETHOPRIM/SULFAMETHOXAZOLE: SIGNIFICANT CHANGE UP
CULTURE RESULTS: ABNORMAL
CULTURE RESULTS: ABNORMAL
METHOD TYPE: SIGNIFICANT CHANGE UP
METHOD TYPE: SIGNIFICANT CHANGE UP
ORGANISM # SPEC MICROSCOPIC CNT: ABNORMAL
ORGANISM # SPEC MICROSCOPIC CNT: ABNORMAL
ORGANISM # SPEC MICROSCOPIC CNT: SIGNIFICANT CHANGE UP
ORGANISM # SPEC MICROSCOPIC CNT: SIGNIFICANT CHANGE UP
SPECIMEN SOURCE: SIGNIFICANT CHANGE UP
SPECIMEN SOURCE: SIGNIFICANT CHANGE UP

## 2023-12-19 NOTE — ED POST DISCHARGE NOTE - RESULT SUMMARY
+UCx, pt treated appropriately in ED with Keflex. - Joslyn Contreras PA-C +UCx, pt treated appropriately in ED with Keflex. - oJslyn Contreras PA-C

## 2024-02-13 ENCOUNTER — EMERGENCY (EMERGENCY)
Facility: HOSPITAL | Age: 44
LOS: 0 days | Discharge: HOME | End: 2024-02-14
Attending: STUDENT IN AN ORGANIZED HEALTH CARE EDUCATION/TRAINING PROGRAM
Payer: COMMERCIAL

## 2024-02-13 VITALS
HEART RATE: 84 BPM | RESPIRATION RATE: 18 BRPM | SYSTOLIC BLOOD PRESSURE: 125 MMHG | DIASTOLIC BLOOD PRESSURE: 79 MMHG | OXYGEN SATURATION: 99 % | TEMPERATURE: 99 F

## 2024-02-13 VITALS — WEIGHT: 160.06 LBS | HEIGHT: 62 IN

## 2024-02-13 DIAGNOSIS — R50.9 FEVER, UNSPECIFIED: ICD-10-CM

## 2024-02-13 DIAGNOSIS — R51.9 HEADACHE, UNSPECIFIED: ICD-10-CM

## 2024-02-13 DIAGNOSIS — J06.9 ACUTE UPPER RESPIRATORY INFECTION, UNSPECIFIED: ICD-10-CM

## 2024-02-13 DIAGNOSIS — Z20.822 CONTACT WITH AND (SUSPECTED) EXPOSURE TO COVID-19: ICD-10-CM

## 2024-02-13 DIAGNOSIS — B97.89 OTHER VIRAL AGENTS AS THE CAUSE OF DISEASES CLASSIFIED ELSEWHERE: ICD-10-CM

## 2024-02-13 DIAGNOSIS — J02.9 ACUTE PHARYNGITIS, UNSPECIFIED: ICD-10-CM

## 2024-02-13 LAB
FLUAV AG NPH QL: SIGNIFICANT CHANGE UP
FLUBV AG NPH QL: SIGNIFICANT CHANGE UP
POCT URINE PREGNANCY TEST: NEGATIVE — SIGNIFICANT CHANGE UP
RSV RNA NPH QL NAA+NON-PROBE: SIGNIFICANT CHANGE UP
S PYO AG SPEC QL IA: NEGATIVE — SIGNIFICANT CHANGE UP
SARS-COV-2 RNA SPEC QL NAA+PROBE: SIGNIFICANT CHANGE UP

## 2024-02-13 PROCEDURE — 99284 EMERGENCY DEPT VISIT MOD MDM: CPT

## 2024-02-13 PROCEDURE — 87880 STREP A ASSAY W/OPTIC: CPT

## 2024-02-13 PROCEDURE — 81025 URINE PREGNANCY TEST: CPT

## 2024-02-13 PROCEDURE — 87081 CULTURE SCREEN ONLY: CPT

## 2024-02-13 PROCEDURE — 99283 EMERGENCY DEPT VISIT LOW MDM: CPT

## 2024-02-13 PROCEDURE — 0241U: CPT

## 2024-02-13 RX ORDER — ACETAMINOPHEN 500 MG
1000 TABLET ORAL ONCE
Refills: 0 | Status: COMPLETED | OUTPATIENT
Start: 2024-02-13 | End: 2024-02-13

## 2024-02-13 RX ORDER — IBUPROFEN 200 MG
600 TABLET ORAL ONCE
Refills: 0 | Status: COMPLETED | OUTPATIENT
Start: 2024-02-13 | End: 2024-02-13

## 2024-02-13 RX ADMIN — Medication 1000 MILLIGRAM(S): at 23:15

## 2024-02-13 RX ADMIN — Medication 600 MILLIGRAM(S): at 23:16

## 2024-02-13 NOTE — ED STATDOCS - PHYSICAL EXAMINATION
GENERAL: A&Ox4, non-toxic appearing, no acute distress  HEENT: NCAT, EOMI, oral mucosa moist, normal conjunctiva, pharyngeal erythema, TMs normal, no significant lymphadenopathy  RESP: CTAB, no respiratory distress, no wheezes/rhonchi/rales, speaking in full sentences  CV: RRR, no murmurs/rubs/gallops  ABDOMEN: soft, non-tender, non-distended, no guarding, no rebound tenderness  MSK: no visible deformities  NEURO: no focal sensory or motor deficits, CN 2-12 grossly intact  SKIN: warm, normal color, well perfused, no rash  PSYCH: normal affect

## 2024-02-13 NOTE — ED STATDOCS - NSFOLLOWUPINSTRUCTIONS_ED_ALL_ED_FT
Faringitis  Pharyngitis  Upper body outline including the pharynx.  La faringitis es inflamación de la garganta (faringe). Es lul causa muy común de dolor de garganta. La faringitis puede ser causada por lul bacteria, olivia por lo general la provoca un virus. La mayoría de los casos de faringitis se curan sin tratamiento.    ¿Cuáles son las causas?  Esta afección puede ser causada por lo siguiente:  Infección por virus (viral). La faringitis viral se contagia fácilmente de lul persona a otra (es contagiosa) al toser, estornudar y compartir objetos o utensilios personales patricia tazas, tenedores, cucharas, cepillos de dientes.  Infección por bacterias (bacteriana). La faringitis bacteriana se puede contagiar al tocarse la nariz o sinai luego de entrar en contacto con las bacterias, o a través de un contacto cercano, patricia por ejemplo, al besarse.  Alergias. Las alergias pueden causar lul acumulación de mucosidad en la garganta (goteo posnasal) que deriva en la inflamación e irritación. A harrell vez, las alergias pueden bloquear las fosas nasales, lo cual hace que se deba respirar por la boca, y esto seca e irrita la garganta.  ¿Qué incrementa el riesgo?  Es más probable que contraiga esta afección si:  Tiene entre 5 y 24 años.  Está en lugares muy concurridos, tales patricia guardería, escuela o vivir en lul residencia estudiantil.  Vive en un ambiente de clima frío.  Tiene debilitado el sistema que combate las enfermedades (inmunitario).  ¿Cuáles son los signos o síntomas?  Los síntomas de esta afección varían según la causa. Los síntomas frecuentes de esta afección incluyen los siguientes:  Dolor de garganta.  Fatiga.  Fiebre no muy page.  Nariz tapada (congestión nasal) y tos.  Dolor de jagdish.  Otros síntomas pueden incluir lo siguiente:  Ganglios en el ger (ganglios linfáticos) que están hinchados.  Erupciones cutáneas.  Película parecida a las placas en la garganta o amígdalas. Por lo general, esto es un síntoma de faringitis bacteriana.  Vómitos.  Ojos rojos con picazón (conjuntivitis).  Pérdida del apetito.  Mechelle musculares y en las articulaciones.  Amígdalas agrandadas.  ¿Cómo se diagnostica?  Esta afección se puede diagnosticar en función de los antecedentes médicos y un examen físico. El médico le hará preguntas sobre la enfermedad y leticia síntomas.    Puede que se deepali un cultivo de harrell garganta para buscar bacterias (prueba rápida para estreptococos). También es posible que se realicen otros análisis de laboratorio, según la posible causa, aunque esto es poco común.    ¿Cómo se trata?  Muchas veces, no se requiere tratamiento para esta afección. La faringitis generalmente mejora en 3 o 4 días sin tratamiento.    La faringitis bacteriana puede tratarse con antibióticos.    Siga estas instrucciones en harrell casa:  Medicamentos    Use los medicamentos de venta stevo y los recetados solamente patricia se lo haya indicado el médico.  Si le recetaron un antibiótico, tómelo patricia se lo haya indicado el médico. No deje de lilly el antibiótico, aunque comience a sentirse mejor.  Use aerosoles para aliviar la garganta patricia se lo haya indicado el médico.  Los niños pueden contraer faringitis. No le dé aspirina al haritha por el riesgo de que contraiga el síndrome de [place].  Control del dolor    A cup of hot tea.  Para ayudar a aliviar el dolor, intente lo siguiente:  Marga líquidos calientes, patricia caldos, infusiones o [place].  También puede comer o beber líquidos fríos o congelados, tales patricia paletas de hielo congelado.  Deepali gárgaras con lul mezcla de agua y sal 3 o 4 veces al día, o cuando sea necesario. Para preparar agua con sal, disuelva totalmente de ½ a 1 cucharadita (de 3 a 6 g) de sal en 1 taza (237 ml) de agua tibia.  Chupe caramelos duros o pastillas para la garganta.  Ponga un humidificador de vapor frío en la habitación por la noche para humedecer el aire.  También puede abrir el [place] de la ducha y sentarse en el baño con la tomas cerrada scarlett 5 a 10 minutos.  Instrucciones generales    A do not smoke cigarettes sign.  No consuma ningún producto que contenga nicotina o tabaco. Estos productos incluyen cigarrillos, tabaco para mascar y aparatos de vapeo, patricia los cigarrillos electrónicos. Si necesita ayuda para dejar de fumar, consulte al médico.  Deepali reposo patricia se lo haya indicado el médico.  Beber suficiente líquido patricia para mantener la orina de color amarillo pálido.  ¿Cómo se bin?  Para ayudar a evitar infectarse o que se propague la infección:  Lávese las sami frecuentemente con agua y jabón scarlett al menos 20 segundos. Use desinfectante para sami si no dispone de agua y jabón.  No se toque los ojos, la nariz o la boca sin haberse lavado las sami, y lávese las sami después de tocarse estas zonas.  No comparta vasos ni utensilios para comer.  Evite el contacto cercano con personas que estén enfermas.  Comuníquese con un médico si:  Tiene bultos grandes y dolorosos en el ger.  Tiene lul erupción cutánea.  Tose con mucosidad de color bonnie, amarillo amarronado o con dhruv.  Solicite ayuda de inmediato si:  El ger se pone rígido.  Comienza a babear o no puede tragar líquidos.  No puede beber ni lilly medicamentos sin vomitar.  Siente un dolor intenso que no se va, incluso luego de lilly los medicamentos.  Tiene dificultades para respirar, y no a causa de la congestión nasal.  Experimenta un nuevo dolor e hinchazón de las articulaciones patricia las rodillas, tobillos, muñecas o codos.  Estos síntomas pueden representar un problema grave que constituye lul emergencia. No espere a siobhan si los síntomas desaparecen. Solicite atención médica de inmediato. Comuníquese con el servicio de emergencias de harrell localidad (911 en los [place]). No conduzca por leticia propios medios hasta el hospital.    Resumen  La faringitis ocurre cuando hay enrojecimiento, dolor e hinchazón (inflamación) en la garganta (faringe).  Si curt la faringitis puede ser causada por lul bacteria, la causa más común son los virus.  La mayoría de los casos de faringitis se curan sin tratamiento.  La faringitis bacteriana se trata con antibióticos.  Esta información no tiene patricia fin reemplazar el consejo del médico. Asegúrese de hacerle al médico cualquier pregunta que tenga.

## 2024-02-13 NOTE — ED STATDOCS - PATIENT PORTAL LINK FT
You can access the FollowMyHealth Patient Portal offered by [facility] by registering at the following website: [email or web address]. By joining [employer]’s FollowMyHealth portal, you will also be able to view your health information using other applications (apps) compatible with our system.

## 2024-02-13 NOTE — ED STATDOCS - OBJECTIVE STATEMENT
#[id number]. 44 y/o F with no pertinent PMHx presents to the ED c/o sore throat x3 days. Pt endorses the pain being in her upper throat and the sides of her neck. Pt also c/o HA and ear pain. Pt also reports having fevers and last took 650mg Tylenol at 10:30am. Pt unsure of tmax, fevers are subjective. Pt denies cough, abd pain. NKDA. No sick contacts. No surgical hx. Pt currently on the last day of her menstrual period.

## 2024-02-13 NOTE — ED ADULT TRIAGE NOTE - CHIEF COMPLAINT QUOTE
Pt ambulatory to the ED with c/o sore throat x3 days. Pt endorses the pain being in her upper throat and the sides of her neck. Pt also endorses having fevers and last took Tylenol this morning. Pt denies any N/V, CP or SOB. Pt denies any significant PMH. NKDA.

## 2024-02-13 NOTE — ED ADULT NURSE NOTE - OBJECTIVE STATEMENT
The pt is a 42 y/o female A&OX4 ambulatory from home presenting to the ED c/o throat pain x3 days. Pt reports her last dose of Tylenol was 10:30AM. Pt denies any fever, chills, CP, SOB, N/V/D. Daughter at bedside.

## 2024-02-13 NOTE — ED STATDOCS - CLINICAL SUMMARY MEDICAL DECISION MAKING FREE TEXT BOX
43-year-old female presenting with 3 days of subjective fever, sore throat, ear pain.  Has only been taking Tylenol once or twice a day.  Patient is well-appearing, afebrile, nontachy.  TMs normal, has mild pharyngeal erythema without any tonsillar exudates or hypertrophy.  Will evaluate with flu/COVID swab, strep swab, give Tylenol and Motrin, reassess

## 2024-02-13 NOTE — ED STATDOCS - PROGRESS NOTE DETAILS
Strep swab negative, flu/COVID test in the lab.  Patient improved with medications.  Given expectant management at home.  Discussed return precautions and all questions answered. Pt in agreement w/ plan. Patient demonstrates decision making capacity, NAD, VSS. Stable for d/c.

## 2024-06-14 ENCOUNTER — RESULT REVIEW (OUTPATIENT)
Age: 44
End: 2024-06-14

## 2024-06-14 ENCOUNTER — APPOINTMENT (OUTPATIENT)
Dept: MAMMOGRAPHY | Facility: CLINIC | Age: 44
End: 2024-06-14
Payer: COMMERCIAL

## 2024-06-14 ENCOUNTER — OUTPATIENT (OUTPATIENT)
Dept: OUTPATIENT SERVICES | Facility: HOSPITAL | Age: 44
LOS: 1 days | End: 2024-06-14
Payer: COMMERCIAL

## 2024-06-14 ENCOUNTER — APPOINTMENT (OUTPATIENT)
Dept: ULTRASOUND IMAGING | Facility: CLINIC | Age: 44
End: 2024-06-14
Payer: COMMERCIAL

## 2024-06-14 DIAGNOSIS — Z00.8 ENCOUNTER FOR OTHER GENERAL EXAMINATION: ICD-10-CM

## 2024-06-14 PROCEDURE — 76642 ULTRASOUND BREAST LIMITED: CPT | Mod: 26,RT

## 2024-06-14 PROCEDURE — G0279: CPT | Mod: 26

## 2024-06-14 PROCEDURE — 77066 DX MAMMO INCL CAD BI: CPT

## 2024-06-14 PROCEDURE — 76642 ULTRASOUND BREAST LIMITED: CPT

## 2024-06-14 PROCEDURE — G0279: CPT

## 2024-06-14 PROCEDURE — 77066 DX MAMMO INCL CAD BI: CPT | Mod: 26

## 2025-01-19 NOTE — ED PROVIDER NOTE - NSICDXPASTSURGICALHX_GEN_ALL_CORE_FT
Patient was able to ambulate safely per baseline down hallway and back, patient reports that the dizziness has fully resolved. Provider notified.    PAST SURGICAL HISTORY:  No significant past surgical history

## 2025-05-07 ENCOUNTER — RESULT REVIEW (OUTPATIENT)
Age: 45
End: 2025-05-07

## 2025-05-07 ENCOUNTER — APPOINTMENT (OUTPATIENT)
Dept: ULTRASOUND IMAGING | Facility: CLINIC | Age: 45
End: 2025-05-07
Payer: COMMERCIAL

## 2025-05-07 ENCOUNTER — OUTPATIENT (OUTPATIENT)
Dept: OUTPATIENT SERVICES | Facility: HOSPITAL | Age: 45
LOS: 1 days | End: 2025-05-07
Payer: COMMERCIAL

## 2025-05-07 DIAGNOSIS — Z00.8 ENCOUNTER FOR OTHER GENERAL EXAMINATION: ICD-10-CM

## 2025-05-07 DIAGNOSIS — R10.13 EPIGASTRIC PAIN: ICD-10-CM

## 2025-05-07 PROCEDURE — 76705 ECHO EXAM OF ABDOMEN: CPT

## 2025-05-07 PROCEDURE — 76705 ECHO EXAM OF ABDOMEN: CPT | Mod: 26

## 2025-05-16 NOTE — ED PROVIDER NOTE - CARE PROVIDER_API CALL
Adal Sands Azuc[facility] [place])  Gastroenterology; Internal Medicine  [address] [place] [address]  Phone: [phone]  Fax: [phone]  Follow Up Time: 1-3 Days

## 2025-06-23 NOTE — ED STATDOCS - DISPOSITION TYPE
H&P reviewed. The patient was examined and there are no changes to the H&P.        
  H&P reviewed. The patient was examined and there are no changes to the H&P.        
DISCHARGE